# Patient Record
Sex: FEMALE | NOT HISPANIC OR LATINO | Employment: FULL TIME | URBAN - METROPOLITAN AREA
[De-identification: names, ages, dates, MRNs, and addresses within clinical notes are randomized per-mention and may not be internally consistent; named-entity substitution may affect disease eponyms.]

---

## 2019-02-11 ENCOUNTER — CONSULT (OUTPATIENT)
Dept: SURGICAL ONCOLOGY | Facility: CLINIC | Age: 49
End: 2019-02-11
Payer: COMMERCIAL

## 2019-02-11 VITALS
HEART RATE: 74 BPM | HEIGHT: 59 IN | SYSTOLIC BLOOD PRESSURE: 110 MMHG | TEMPERATURE: 98.4 F | WEIGHT: 219 LBS | RESPIRATION RATE: 14 BRPM | DIASTOLIC BLOOD PRESSURE: 72 MMHG | BODY MASS INDEX: 44.15 KG/M2

## 2019-02-11 DIAGNOSIS — R92.8 ABNORMAL MRI, BREAST: Primary | ICD-10-CM

## 2019-02-11 DIAGNOSIS — Z91.89 INCREASED RISK OF BREAST CANCER: ICD-10-CM

## 2019-02-11 DIAGNOSIS — Z80.3 FAMILY HISTORY OF MALIGNANT NEOPLASM OF BREAST: ICD-10-CM

## 2019-02-11 DIAGNOSIS — Z13.71 BRCA NEGATIVE: ICD-10-CM

## 2019-02-11 DIAGNOSIS — R92.2 DENSE BREAST TISSUE: ICD-10-CM

## 2019-02-11 PROBLEM — R92.30 DENSE BREAST TISSUE: Status: ACTIVE | Noted: 2019-02-11

## 2019-02-11 PROCEDURE — 99244 OFF/OP CNSLTJ NEW/EST MOD 40: CPT | Performed by: SURGERY

## 2019-02-11 RX ORDER — ERGOCALCIFEROL 1.25 MG/1
50000 CAPSULE ORAL WEEKLY
Refills: 3 | COMMUNITY
Start: 2018-11-28

## 2019-02-11 RX ORDER — POTASSIUM CHLORIDE 1500 MG/1
20 TABLET, EXTENDED RELEASE ORAL DAILY
Refills: 3 | COMMUNITY
Start: 2019-01-16

## 2019-02-11 RX ORDER — LISINOPRIL 20 MG/1
20 TABLET ORAL
COMMUNITY
Start: 2018-09-10

## 2019-02-11 RX ORDER — FEXOFENADINE HCL 180 MG/1
180 TABLET ORAL
COMMUNITY
Start: 2010-01-26

## 2019-02-11 RX ORDER — ALBUTEROL SULFATE 90 UG/1
1 AEROSOL, METERED RESPIRATORY (INHALATION) EVERY 4 HOURS PRN
COMMUNITY
Start: 2017-07-10

## 2019-02-11 RX ORDER — ALBUTEROL SULFATE 2.5 MG/3ML
SOLUTION RESPIRATORY (INHALATION)
COMMUNITY
Start: 2018-09-18

## 2019-02-11 RX ORDER — LEVOTHYROXINE SODIUM 112 UG/1
TABLET ORAL
COMMUNITY
Start: 2019-01-22

## 2019-02-11 RX ORDER — DICYCLOMINE HYDROCHLORIDE 10 MG/1
10 CAPSULE ORAL
COMMUNITY
Start: 2010-12-14

## 2019-02-11 RX ORDER — TRIAMTERENE AND HYDROCHLOROTHIAZIDE 37.5; 25 MG/1; MG/1
1 CAPSULE ORAL
COMMUNITY
Start: 2018-09-10

## 2019-02-11 RX ORDER — METFORMIN HYDROCHLORIDE 500 MG/1
1000 TABLET, EXTENDED RELEASE ORAL
COMMUNITY
Start: 2018-03-14

## 2019-02-11 RX ORDER — MEDROXYPROGESTERONE ACETATE 10 MG/1
10 TABLET ORAL
COMMUNITY
Start: 2018-08-21

## 2019-02-11 NOTE — LETTER
February 11, 2019     [address] [place] [id number]    Patient: Libia David   YOB: 1970   Date of Visit: 2/11/2019       Dear Dr Pily Siddiqui: Thank you for referring Libia David to me for evaluation  Below are my notes for this consultation  If you have questions, please do not hesitate to call me  I look forward to following your patient along with you  Sincerely,        Wendy Riley MD        CC: No Recipients  Wendy Riley MD  2/11/2019 10:00 AM  Sign at close encounter    Breast Consultation-Surgical Oncology     [address] [address]    Name:  Libia David  YOB: 1970  MRN:  [id number]    Assessment/Plan   Diagnoses and all orders for this visit:    Abnormal MRI, breast    Dense breast tissue    Increased risk of breast cancer    Family history of malignant neoplasm of breast    BRCA negative    Other orders  -     dicyclomine (BENTYL) 10 mg capsule; Take 10 mg by mouth  -     fexofenadine (ALLEGRA) 180 MG tablet; Take 180 mg by mouth  -     fluticasone-salmeterol (ADVAIR DISKUS) 250-50 mcg/dose inhaler; Inhale 1 puff  -     levothyroxine 112 mcg tablet; TAKE 1 TABLET BY MOUTH EVERY DAY  -     lisinopril (ZESTRIL) 20 mg tablet; Take 20 mg by mouth  -     medroxyPROGESTERone (PROVERA) 10 mg tablet; Take 10 mg by mouth  -     metFORMIN (GLUCOPHAGE-XR) 500 mg 24 hr tablet; Take 1,000 mg by mouth  -     albuterol (PROAIR HFA) 90 mcg/act inhaler; Inhale 1 puff every 4 (four) hours as needed  -     albuterol (2 5 mg/3 mL) 0 083 % nebulizer solution; TAKE 3 ML (2 5 MG TOTAL) BY NEBULIZATION 3 (THREE) TIMES A DAY  -     triamterene-hydrochlorothiazide (DYAZIDE) 37 5-25 mg per capsule; Take 1 capsule by mouth  -     KLOR-CON M20 20 MEQ tablet; Take 20 mEq by mouth daily  -     ergocalciferol (VITAMIN D2) 50,000 units;  Take 50,000 Units by mouth once a week  -     Multiple Vitamins-Minerals (MULTIVITAMIN ADULTS PO); daily  HPI: Yony Leal is a 53y o  year old female who presents with abnormal breast imaging  Pt shares on occasion she can feel a left lateral breast lump  She denies any nipple discharge or skin changes  She also has a family history of breast cancer in her mother and grandmother  She had genetic testing performed at [facility], which was negative  Surgical treatment to date consisted of n/a, prior benign biopsy  Oncology History:     No history exists  Pertinent reproductive history:  Age at menarche:  6  OB History        0    Para   0    Term   0       0    AB   0    Living   0       SAB   0    TAB   0    Ectopic   0    Multiple   0    Live Births   0           Obstetric Comments   Menarche : 10  Hx of BCP and Depo provera   Hx of hormone placement           Age at first live birth:  Never pregnant  Age at menopause:  n/a  Hysterectomy/Oophrectomy:  No  Hormone replacement therapy:  No  Birth control pills:  Yes    Problem List:   There is no problem list on file for this patient      Past Medical History:   Diagnosis Date    Asthma     Disease of thyroid gland     History of IBS     History of PCOS     Hypertension     Seasonal allergies     Stomach ulcer      Past Surgical History:   Procedure Laterality Date    BREAST BIOPSY Left 2012    LAPAROSCOPY       Family History   Problem Relation Age of Onset    Breast cancer Mother 61    Breast cancer Maternal Grandmother         at age 48 and 61    Colon cancer Maternal Grandmother 67    Brain cancer Maternal Grandfather 58     Social History     Socioeconomic History    Marital status: Unknown     Spouse name: Not on file    Number of children: Not on file    Years of education: Not on file    Highest education level: Not on file   Occupational History    Not on file   Social Needs    Financial resource strain: Not on file   Amanda Gonzalez Food insecurity:     Worry: Not on file     Inability: Not on file    Transportation needs:     Medical: Not on file     Non-medical: Not on file   Tobacco Use    Smoking status: Never Smoker    Smokeless tobacco: Never Used   Substance and Sexual Activity    Alcohol use: Not Currently    Drug use: Never    Sexual activity: Not on file   Lifestyle    Physical activity:     Days per week: Not on file     Minutes per session: Not on file    Stress: Not on file   Relationships    Social connections:     Talks on phone: Not on file     Gets together: Not on file     Attends Episcopal service: Not on file     Active member of club or organization: Not on file     Attends meetings of clubs or organizations: Not on file     Relationship status: Not on file    Intimate partner violence:     Fear of current or ex partner: Not on file     Emotionally abused: Not on file     Physically abused: Not on file     Forced sexual activity: Not on file   Other Topics Concern    Not on file   Social History Narrative    Not on file     Current Outpatient Medications   Medication Sig Dispense Refill    albuterol (2 5 mg/3 mL) 0 083 % nebulizer solution TAKE 3 ML (2 5 MG TOTAL) BY NEBULIZATION 3 (THREE) TIMES A DAY        albuterol (PROAIR HFA) 90 mcg/act inhaler Inhale 1 puff every 4 (four) hours as needed      dicyclomine (BENTYL) 10 mg capsule Take 10 mg by mouth      ergocalciferol (VITAMIN D2) 50,000 units Take 50,000 Units by mouth once a week  3    fexofenadine (ALLEGRA) 180 MG tablet Take 180 mg by mouth      fluticasone-salmeterol (ADVAIR DISKUS) 250-50 mcg/dose inhaler Inhale 1 puff      KLOR-CON M20 20 MEQ tablet Take 20 mEq by mouth daily  3    levothyroxine 112 mcg tablet TAKE 1 TABLET BY MOUTH EVERY DAY      lisinopril (ZESTRIL) 20 mg tablet Take 20 mg by mouth      medroxyPROGESTERone (PROVERA) 10 mg tablet Take 10 mg by mouth      metFORMIN (GLUCOPHAGE-XR) 500 mg 24 hr tablet Take 1,000 mg by mouth  Multiple Vitamins-Minerals (MULTIVITAMIN ADULTS PO) daily   triamterene-hydrochlorothiazide (DYAZIDE) 37 5-25 mg per capsule Take 1 capsule by mouth       No current facility-administered medications for this visit  Allergies   Allergen Reactions    Azithromycin      Other reaction(s): Respiratory Distress    Cefuroxime      Other reaction(s): Respiratory Distress    Amoxicillin Other (See Comments) and Rash    Nitrofurantoin Rash    Penicillin V Diarrhea and Rash     Other reaction(s): Respiratory Distress    Sulfamethoxazole-Trimethoprim Hives and Rash         The following portions of the patient's history were reviewed and updated as appropriate: allergies, current medications, past family history, past medical history, past social history, past surgical history and problem list     Review of Systems:  Review of Systems   Constitutional: Positive for unexpected weight change (15 lb weight loss)  Negative for appetite change and fever  Eyes: Negative  Respiratory: Positive for shortness of breath ( secondary to asthma)  Cardiovascular: Negative  Gastrointestinal: Negative  Endocrine: Negative  Genitourinary: Positive for hematuria ( previously evaluated)  Musculoskeletal: Negative  Negative for arthralgias and myalgias  Skin: Negative  Allergic/Immunologic: Negative  Neurological: Negative  Hematological: Negative  Negative for adenopathy  Does not bruise/bleed easily  Psychiatric/Behavioral: Negative  Physical Exam:  Physical Exam   Constitutional: She is oriented to person, place, and time  She appears well-developed and well-nourished  HENT:   Head: Normocephalic and atraumatic  Cardiovascular: Normal heart sounds  Pulmonary/Chest: Breath sounds normal  Right breast exhibits no inverted nipple, no mass, no nipple discharge, no skin change and no tenderness   Left breast exhibits no inverted nipple, no mass, no nipple discharge, no skin change and no tenderness  Abdominal: Soft  Lymphadenopathy:        Right axillary: No pectoral and no lateral adenopathy present  Left axillary: No pectoral and no lateral adenopathy present  Right: No supraclavicular adenopathy present  Left: No supraclavicular adenopathy present  Neurological: She is alert and oriented to person, place, and time  Psychiatric: She has a normal mood and affect  [id number]  3D bilateral screening mammogram B2 (3)    Breast MRI  B0 for lesion on the left side  18 left breast second look US  B3 benign appearing and thought to be a fibroadenoma  18 left breast US  B3 stable lesion for which an MRI was recommended in March at the time of her mammogram          Discussion/Summary:  81-year-old female here today secondary to a concern on her breast imaging as well as her family history of breast cancer  Her mother had breast cancer at the age of 61  Her maternal grandmother had bilateral breast cancer  Both her mother and the patient herself had genetic testing, which was negative  This is in Care [facility] from [facility]  There are no concerns on her examination today  The patient does have dense breast tissue as well  She states that she is scheduled for the March mammogram and MRI per her gynecologist   She will also be seeing her gynecologist this coming spring/summer  I asked her to copy me on the radiology results  Provided there are no concerns, I will plan to see her in one year for another clinical exam   Given her risk as well as dense tissue, she should continue both mammography and breast MRI  These would be best done in alternating fashion in the future  I advised her to return sooner should she have any concerns

## 2019-02-11 NOTE — PROGRESS NOTES
Breast Consultation-Surgical Oncology     [address]  [facility] [place]  [address] [place] [address]    Name:  Megan Dodd  YOB: 1970  MRN:  [id number]    Assessment/Plan   Diagnoses and all orders for this visit:    Abnormal MRI, breast    Dense breast tissue    Increased risk of breast cancer    Family history of malignant neoplasm of breast    BRCA negative    Other orders  -     dicyclomine (BENTYL) 10 mg capsule; Take 10 mg by mouth  -     fexofenadine (ALLEGRA) 180 MG tablet; Take 180 mg by mouth  -     fluticasone-salmeterol (ADVAIR DISKUS) 250-50 mcg/dose inhaler; Inhale 1 puff  -     levothyroxine 112 mcg tablet; TAKE 1 TABLET BY MOUTH EVERY DAY  -     lisinopril (ZESTRIL) 20 mg tablet; Take 20 mg by mouth  -     medroxyPROGESTERone (PROVERA) 10 mg tablet; Take 10 mg by mouth  -     metFORMIN (GLUCOPHAGE-XR) 500 mg 24 hr tablet; Take 1,000 mg by mouth  -     albuterol (PROAIR HFA) 90 mcg/act inhaler; Inhale 1 puff every 4 (four) hours as needed  -     albuterol (2 5 mg/3 mL) 0 083 % nebulizer solution; TAKE 3 ML (2 5 MG TOTAL) BY NEBULIZATION 3 (THREE) TIMES A DAY  -     triamterene-hydrochlorothiazide (DYAZIDE) 37 5-25 mg per capsule; Take 1 capsule by mouth  -     KLOR-CON M20 20 MEQ tablet; Take 20 mEq by mouth daily  -     ergocalciferol (VITAMIN D2) 50,000 units; Take 50,000 Units by mouth once a week  -     Multiple Vitamins-Minerals (MULTIVITAMIN ADULTS PO); daily  HPI: Megan Dodd is a 53y o  year old female who presents with abnormal breast imaging  Pt shares on occasion she can feel a left lateral breast lump  She denies any nipple discharge or skin changes  She also has a family history of breast cancer in her mother and grandmother  She had genetic testing performed at [facility], which was negative  Surgical treatment to date consisted of n/a, prior benign biopsy      Oncology History:     No history exists  Pertinent reproductive history:  Age at menarche:  6  OB History        0    Para   0    Term   0       0    AB   0    Living   0       SAB   0    TAB   0    Ectopic   0    Multiple   0    Live Births   0           Obstetric Comments   Menarche : 10  Hx of BCP and Depo provera   Hx of hormone placement           Age at first live birth:  Never pregnant  Age at menopause:  n/a  Hysterectomy/Oophrectomy:  No  Hormone replacement therapy:  No  Birth control pills:  Yes    Problem List:   There is no problem list on file for this patient      Past Medical History:   Diagnosis Date    Asthma     Disease of thyroid gland     History of IBS     History of PCOS     Hypertension     Seasonal allergies     Stomach ulcer      Past Surgical History:   Procedure Laterality Date    BREAST BIOPSY Left 2012    LAPAROSCOPY  2010     Family History   Problem Relation Age of Onset    Breast cancer Mother 61    Breast cancer Maternal Grandmother         at age 48 and 61    Colon cancer Maternal Grandmother 67    Brain cancer Maternal Grandfather 58     Social History     Socioeconomic History    Marital status: Unknown     Spouse name: Not on file    Number of children: Not on file    Years of education: Not on file    Highest education level: Not on file   Occupational History    Not on file   Social Needs    Financial resource strain: Not on file    Food insecurity:     Worry: Not on file     Inability: Not on file    Transportation needs:     Medical: Not on file     Non-medical: Not on file   Tobacco Use    Smoking status: Never Smoker    Smokeless tobacco: Never Used   Substance and Sexual Activity    Alcohol use: Not Currently    Drug use: Never    Sexual activity: Not on file   Lifestyle    Physical activity:     Days per week: Not on file     Minutes per session: Not on file    Stress: Not on file   Relationships    Social connections:     Talks on phone: Not on file Gets together: Not on file     Attends Gnosticism service: Not on file     Active member of club or organization: Not on file     Attends meetings of clubs or organizations: Not on file     Relationship status: Not on file    Intimate partner violence:     Fear of current or ex partner: Not on file     Emotionally abused: Not on file     Physically abused: Not on file     Forced sexual activity: Not on file   Other Topics Concern    Not on file   Social History Narrative    Not on file     Current Outpatient Medications   Medication Sig Dispense Refill    albuterol (2 5 mg/3 mL) 0 083 % nebulizer solution TAKE 3 ML (2 5 MG TOTAL) BY NEBULIZATION 3 (THREE) TIMES A DAY   albuterol (PROAIR HFA) 90 mcg/act inhaler Inhale 1 puff every 4 (four) hours as needed      dicyclomine (BENTYL) 10 mg capsule Take 10 mg by mouth      ergocalciferol (VITAMIN D2) 50,000 units Take 50,000 Units by mouth once a week  3    fexofenadine (ALLEGRA) 180 MG tablet Take 180 mg by mouth      fluticasone-salmeterol (ADVAIR DISKUS) 250-50 mcg/dose inhaler Inhale 1 puff      KLOR-CON M20 20 MEQ tablet Take 20 mEq by mouth daily  3    levothyroxine 112 mcg tablet TAKE 1 TABLET BY MOUTH EVERY DAY      lisinopril (ZESTRIL) 20 mg tablet Take 20 mg by mouth      medroxyPROGESTERone (PROVERA) 10 mg tablet Take 10 mg by mouth      metFORMIN (GLUCOPHAGE-XR) 500 mg 24 hr tablet Take 1,000 mg by mouth      Multiple Vitamins-Minerals (MULTIVITAMIN ADULTS PO) daily   triamterene-hydrochlorothiazide (DYAZIDE) 37 5-25 mg per capsule Take 1 capsule by mouth       No current facility-administered medications for this visit        Allergies   Allergen Reactions    Azithromycin      Other reaction(s): Respiratory Distress    Cefuroxime      Other reaction(s): Respiratory Distress    Amoxicillin Other (See Comments) and Rash    Nitrofurantoin Rash    Penicillin V Diarrhea and Rash     Other reaction(s): Respiratory Distress    Sulfamethoxazole-Trimethoprim Hives and Rash         The following portions of the patient's history were reviewed and updated as appropriate: allergies, current medications, past family history, past medical history, past social history, past surgical history and problem list     Review of Systems:  Review of Systems   Constitutional: Positive for unexpected weight change (15 lb weight loss)  Negative for appetite change and fever  Eyes: Negative  Respiratory: Positive for shortness of breath ( secondary to asthma)  Cardiovascular: Negative  Gastrointestinal: Negative  Endocrine: Negative  Genitourinary: Positive for hematuria ( previously evaluated)  Musculoskeletal: Negative  Negative for arthralgias and myalgias  Skin: Negative  Allergic/Immunologic: Negative  Neurological: Negative  Hematological: Negative  Negative for adenopathy  Does not bruise/bleed easily  Psychiatric/Behavioral: Negative  Physical Exam:  Physical Exam   Constitutional: She is oriented to person, place, and time  She appears well-developed and well-nourished  HENT:   Head: Normocephalic and atraumatic  Cardiovascular: Normal heart sounds  Pulmonary/Chest: Breath sounds normal  Right breast exhibits no inverted nipple, no mass, no nipple discharge, no skin change and no tenderness  Left breast exhibits no inverted nipple, no mass, no nipple discharge, no skin change and no tenderness  Abdominal: Soft  Lymphadenopathy:        Right axillary: No pectoral and no lateral adenopathy present  Left axillary: No pectoral and no lateral adenopathy present  Right: No supraclavicular adenopathy present  Left: No supraclavicular adenopathy present  Neurological: She is alert and oriented to person, place, and time  Psychiatric: She has a normal mood and affect             [id number]  3D bilateral screening mammogram B2 (3)    Breast MRI  B0 for lesion on the left side  09/21/18 left breast second look US  B3 benign appearing and thought to be a fibroadenoma  12/24/18 left breast US  B3 stable lesion for which an MRI was recommended in March at the time of her mammogram          Discussion/Summary:  52-year-old female here today secondary to a concern on her breast imaging as well as her family history of breast cancer  Her mother had breast cancer at the age of 61  Her maternal grandmother had bilateral breast cancer  Both her mother and the patient herself had genetic testing, which was negative  This is in Care everywhere from [facility]  There are no concerns on her examination today  The patient does have dense breast tissue as well  She states that she is scheduled for the March mammogram and MRI per her gynecologist   She will also be seeing her gynecologist this coming spring/summer  I asked her to copy me on the radiology results  Provided there are no concerns, I will plan to see her in one year for another clinical exam   Given her risk as well as dense tissue, she should continue both mammography and breast MRI  These would be best done in alternating fashion in the future  I advised her to return sooner should she have any concerns

## 2020-04-20 ENCOUNTER — TELEPHONE (OUTPATIENT)
Dept: SURGICAL ONCOLOGY | Facility: CLINIC | Age: 50
End: 2020-04-20

## 2020-04-21 ENCOUNTER — OFFICE VISIT (OUTPATIENT)
Dept: SURGICAL ONCOLOGY | Facility: CLINIC | Age: 50
End: 2020-04-21
Payer: COMMERCIAL

## 2020-04-21 VITALS
HEART RATE: 113 BPM | DIASTOLIC BLOOD PRESSURE: 80 MMHG | WEIGHT: 225.4 LBS | TEMPERATURE: 97.8 F | RESPIRATION RATE: 16 BRPM | HEIGHT: 59 IN | BODY MASS INDEX: 45.44 KG/M2 | SYSTOLIC BLOOD PRESSURE: 140 MMHG

## 2020-04-21 DIAGNOSIS — R92.2 DENSE BREAST TISSUE: Primary | ICD-10-CM

## 2020-04-21 DIAGNOSIS — Z12.31 SCREENING MAMMOGRAM FOR HIGH-RISK PATIENT: ICD-10-CM

## 2020-04-21 DIAGNOSIS — Z80.3 FAMILY HISTORY OF MALIGNANT NEOPLASM OF BREAST: ICD-10-CM

## 2020-04-21 DIAGNOSIS — Z13.71 BRCA NEGATIVE: ICD-10-CM

## 2020-04-21 DIAGNOSIS — Z91.89 INCREASED RISK OF BREAST CANCER: ICD-10-CM

## 2020-04-21 PROBLEM — D24.2 FIBROADENOMA OF BREAST, LEFT: Status: RESOLVED | Noted: 2020-04-21 | Resolved: 2020-04-21

## 2020-04-21 PROBLEM — R92.8 ABNORMAL MRI, BREAST: Status: RESOLVED | Noted: 2019-02-11 | Resolved: 2020-04-21

## 2020-04-21 PROBLEM — D24.2 FIBROADENOMA OF BREAST, LEFT: Status: ACTIVE | Noted: 2020-04-21

## 2020-04-21 PROCEDURE — 99213 OFFICE O/P EST LOW 20 MIN: CPT | Performed by: SURGERY

## 2020-12-03 DIAGNOSIS — Z80.3 FAMILY HISTORY OF MALIGNANT NEOPLASM OF BREAST: ICD-10-CM

## 2020-12-03 DIAGNOSIS — Z91.89 INCREASED RISK OF BREAST CANCER: ICD-10-CM

## 2020-12-03 DIAGNOSIS — R92.2 DENSE BREAST TISSUE: ICD-10-CM

## 2021-02-26 DIAGNOSIS — Z23 ENCOUNTER FOR IMMUNIZATION: ICD-10-CM

## 2021-05-04 DIAGNOSIS — Z12.31 SCREENING MAMMOGRAM FOR HIGH-RISK PATIENT: ICD-10-CM

## 2021-05-11 ENCOUNTER — OFFICE VISIT (OUTPATIENT)
Dept: SURGICAL ONCOLOGY | Facility: CLINIC | Age: 51
End: 2021-05-11
Payer: COMMERCIAL

## 2021-05-11 VITALS
SYSTOLIC BLOOD PRESSURE: 150 MMHG | WEIGHT: 221 LBS | HEIGHT: 59 IN | TEMPERATURE: 97.3 F | DIASTOLIC BLOOD PRESSURE: 84 MMHG | BODY MASS INDEX: 44.55 KG/M2 | HEART RATE: 88 BPM

## 2021-05-11 DIAGNOSIS — Z91.89 INCREASED RISK OF BREAST CANCER: ICD-10-CM

## 2021-05-11 DIAGNOSIS — Z13.71 BRCA NEGATIVE: ICD-10-CM

## 2021-05-11 DIAGNOSIS — R92.2 DENSE BREAST TISSUE: Primary | ICD-10-CM

## 2021-05-11 DIAGNOSIS — Z12.31 SCREENING MAMMOGRAM FOR HIGH-RISK PATIENT: ICD-10-CM

## 2021-05-11 DIAGNOSIS — Z80.3 FAMILY HISTORY OF MALIGNANT NEOPLASM OF BREAST: ICD-10-CM

## 2021-05-11 PROCEDURE — 99213 OFFICE O/P EST LOW 20 MIN: CPT | Performed by: SURGERY

## 2021-05-11 NOTE — PROGRESS NOTES
Surgical Oncology Follow Up       [address] [facility] [address]    [facility]  1970  [id number]  [address]  [facility] SURGICAL ONCOLOGY [place]  [place] [address]    Chief Complaint   Patient presents with    Follow-up       Assessment/Plan   Diagnoses and all orders for this visit:    Dense breast tissue  -     MRI breast bilateral w and wo contrast w cad; Future    BRCA negative    Family history of malignant neoplasm of breast  -     MRI breast bilateral w and wo contrast w cad; Future    Increased risk of breast cancer  -     MRI breast bilateral w and wo contrast w cad; Future    Screening mammogram for high-risk patient  -     Mammo screening bilateral w 3d & cad; Future        Advance Care Planning/Advance Directives:  Did not discuss  with the patient  Oncology History:    Oncology History    No history exists  History of Present Illness: Follow-up visit secondary to dense breast tissue and family history of breast cancer, no concerns  -Interval History: recent mammogram and MRI    Review of Systems:  Review of Systems   Constitutional: Negative  Negative for appetite change and fever  Eyes: Negative  Respiratory: Negative for shortness of breath  Cardiovascular: Negative  Gastrointestinal: Negative  Endocrine: Negative  Genitourinary: Negative  Musculoskeletal: Negative  Negative for arthralgias and myalgias  Skin: Negative  Allergic/Immunologic: Negative  Neurological: Negative  Hematological: Negative  Negative for adenopathy  Does not bruise/bleed easily  Psychiatric/Behavioral: Negative          Patient Active Problem List   Diagnosis    Dense breast tissue    Increased risk of breast cancer    Family history of malignant neoplasm of breast    BRCA negative    Screening mammogram for high-risk patient     Past Medical History:   Diagnosis Date    Asthma     Disease of thyroid gland     History of IBS     History of PCOS     Hypertension     Seasonal allergies     Stomach ulcer      Past Surgical History:   Procedure Laterality Date    BREAST BIOPSY Left 2012    LAPAROSCOPY  2010     Family History   Problem Relation Age of Onset    Breast cancer Mother 61    Breast cancer Maternal Grandmother         at age 48 and 61    Colon cancer Maternal Grandmother 67    Brain cancer Maternal Grandfather 58     Social History     Socioeconomic History    Marital status: Unknown     Spouse name: Not on file    Number of children: Not on file    Years of education: Not on file    Highest education level: Not on file   Occupational History    Not on file   Social Needs    Financial resource strain: Not on file    Food insecurity     Worry: Not on file     Inability: Not on file    Transportation needs     Medical: Not on file     Non-medical: Not on file   Tobacco Use    Smoking status: Never Smoker    Smokeless tobacco: Never Used   Substance and Sexual Activity    Alcohol use: Not Currently    Drug use: Never    Sexual activity: Not on file   Lifestyle    Physical activity     Days per week: Not on file     Minutes per session: Not on file    Stress: Not on file   Relationships    Social connections     Talks on phone: Not on file     Gets together: Not on file     Attends Restorationist service: Not on file     Active member of club or organization: Not on file     Attends meetings of clubs or organizations: Not on file     Relationship status: Not on file    Intimate partner violence     Fear of current or ex partner: Not on file     Emotionally abused: Not on file     Physically abused: Not on file     Forced sexual activity: Not on file   Other Topics Concern    Not on file   Social History Narrative    Not on file       Current Outpatient Medications:     albuterol (2 5 mg/3 mL) 0 083 % nebulizer solution, TAKE 3 ML (2 5 MG TOTAL) BY NEBULIZATION 3 (THREE) TIMES A DAY , Disp: , Rfl:     albuterol (PROAIR HFA) 90 mcg/act inhaler, Inhale 1 puff every 4 (four) hours as needed, Disp: , Rfl:     dicyclomine (BENTYL) 10 mg capsule, Take 10 mg by mouth, Disp: , Rfl:     ergocalciferol (VITAMIN D2) 50,000 units, Take 50,000 Units by mouth once a week, Disp: , Rfl: 3    fexofenadine (ALLEGRA) 180 MG tablet, Take 180 mg by mouth, Disp: , Rfl:     fluticasone-salmeterol (ADVAIR DISKUS) 250-50 mcg/dose inhaler, Inhale 1 puff, Disp: , Rfl:     KLOR-CON M20 20 MEQ tablet, Take 20 mEq by mouth daily, Disp: , Rfl: 3    levothyroxine 112 mcg tablet, TAKE 1 TABLET BY MOUTH EVERY DAY, Disp: , Rfl:     lisinopril (ZESTRIL) 20 mg tablet, Take 20 mg by mouth, Disp: , Rfl:     medroxyPROGESTERone (PROVERA) 10 mg tablet, Take 10 mg by mouth, Disp: , Rfl:     metFORMIN (GLUCOPHAGE-XR) 500 mg 24 hr tablet, Take 1,000 mg by mouth, Disp: , Rfl:     Multiple Vitamins-Minerals (MULTIVITAMIN ADULTS PO), daily  , Disp: , Rfl:     triamterene-hydrochlorothiazide (DYAZIDE) 37 5-25 mg per capsule, Take 1 capsule by mouth, Disp: , Rfl:   Allergies   Allergen Reactions    Azithromycin      Other reaction(s): Respiratory Distress    Cefuroxime      Other reaction(s): Respiratory Distress    Amoxicillin Other (See Comments) and Rash    Nitrofurantoin Rash    Penicillin V Diarrhea and Rash     Other reaction(s): Respiratory Distress    Sulfamethoxazole-Trimethoprim Hives and Rash       The following portions of the patient's history were reviewed and updated as appropriate: allergies, current medications, past family history, past medical history, past social history, past surgical history and problem list         Vitals:    05/11/21 0856   BP: 150/84   Pulse: 88   Temp: (!) 97 3 °F (36 3 °C)       Physical Exam  Constitutional:       General: She is not in acute distress  Appearance: She is well-developed     HENT:      Head: Normocephalic and atraumatic  Chest:      Breasts:         Right: No inverted nipple, mass, nipple discharge, skin change or tenderness  Left: No inverted nipple, mass, nipple discharge, skin change or tenderness  Lymphadenopathy:      Upper Body:      Right upper body: No supraclavicular, axillary or pectoral adenopathy  Left upper body: No supraclavicular, axillary or pectoral adenopathy  Neurological:      Mental Status: She is alert and oriented to person, place, and time  Psychiatric:         Mood and Affect: Mood normal            Results:  Labs:      Imaging   11/28/2020 bilateral breast MRI was benign   05/04/2021 bilateral 3D screening mammogram was benign BI-RADS two with a density of three    I reviewed the above imaging data  Discussion/Summary: 78-year-old female with dense breast tissue and family history of breast cancer  She had a prior benign biopsy revealing a fibroadenoma  She is a high risk patient  There are no concerns on examination today  All of her imaging has remained benign to include alternating mammography and breast MRI  I will continue to see her on an annual basis continue both imaging modalities for the time being

## 2022-05-13 ENCOUNTER — TELEPHONE (OUTPATIENT)
Dept: SURGICAL ONCOLOGY | Facility: CLINIC | Age: 52
End: 2022-05-13

## 2022-05-13 NOTE — TELEPHONE ENCOUNTER
Called and left voicemail stating, Dr Brandon had a personal emergency, we will need to reschedule your upcoming appointment on Monday, 5/16  I have cancelled your appointment  We will call you on Monday to reschedule your appointment or if you'd like to call me today, my phone number is [phone]  I will be at this number until 4:30pm today

## 2023-06-06 ENCOUNTER — TELEPHONE (OUTPATIENT)
Dept: BARIATRICS | Facility: CLINIC | Age: 53
End: 2023-06-06

## 2023-06-06 NOTE — PROGRESS NOTES
Weight Management Medical Nutrition Assessment  Ervin Mulligan presented for a meal planning session  Today's weight is 229#  Ervin Mulligan is very motivated  She reports a history of weight loss on her own  In the past she monitored portion sizes vs counting calories  Per dietary recall patient consumes excess calories from sweet carbohydrates and unmeasured portion sizes during meals  We reviewed calorie and protein goals for meals and snacks  Discussed portion control strategies  Reviewed high protein snacks that satisfy sweet cravings  Patient agreeable to add a snack mid-afternoon  She has a goal to start exercising more (return to the gym and walk daily)  We developed and reviewed a low calorie meal plan      Patient seen by Medical Provider in past 6 months:  no  Requested to schedule appointment with Medical Provider: No      Anthropometric Measurements  Start Weight (#): 229#  Current Weight (#): 229#  TBW % Change from start weight: n/a  Ideal Body Weight (#): 95#  Goal Weight (#): 200#  Highest:  Lowest:    Weight Loss History  Previous weight loss attempts: met with dietitian in 2018, Diet    Food and Nutrition Related History  Wake up: 8-8:30AM  Bed Time: 9-11PM    Food Recall  Breakfast: 9-9:15AM carb controlled bread with peanut butter OR Mark Charms OR 3 eggs   Snack: skip  Lunch: 11AM-12:30PM turkey sandwich or grilled chicken salad with 1 Tbsp honey mustard OR leftovers (zucchini, pork chops, ribs, etc)  Snack: skip  Dinner: 5-6PM 3oz protein (pork chops, chicken, ribs, meatloaf), 1 cup starch (corn on the cob, sometimes rice, baked potateos), 1/2 cup vegetable (mixed vegetables, spinach)   Snack: soft pretzel bites OR bowl of Mark Charms OR ice cream    Beverages: 32oz bottles water, 20oz zero sugar gingerale  Volume of beverage intake:     Weekends: Same  Cravings: sometimes sweets at night  Trouble area of day: after dinner    Frequency of Eating out: 2 nights per week  Food restrictions: none reported Cooking: self or significant other  Food Shopping: self    Physical Activity Intake  Activity: active job (goal to incorporate exercise)  Frequency: daily  Physical limitations/barriers to exercise: none reported     Estimated Needs  Energy  [employer] Needs: BMR : 7531   1-2# loss weekly sedentary: 742-9277            1-2# loss weekly lightly active: 3030-5150  Maintenance calories for sedentary activity level: 1871  Protein: 52-65g      (1 2-1 5g/kg IBW)  Fluid: 55oz     (35mL/kg IBW)    Nutrition Diagnosis  Yes;     Overweight/obesity  related to Food and nutrition related knowledge deficit as evidenced by  BMI more than normative standard for age and sex (obesity-grade III 36+)       Nutrition Intervention    Nutrition Prescription  Calories: 5228-6704 calories (flex up if exercising)  Protein: 52-65g  Fluid: 55oz    Meal Plan (Vladimir/Pro/Carb)  Breakfast: 300/20/30-45  Snack:  Lunch: 300/20/30-45  Snack: 100-150/15/15-20  Dinner: 300-350/20/30-45  Snack: 100-150/5-10/15-20    Nutrition Education:    Calorie controlled menu  Lean protein food choices  Healthy snack options  Food journaling tips      Nutrition Counseling:  Strategies: meal planning, portion sizes, healthy snack choices, hydration, fiber intake, protein intake, exercise, food journal      Monitoring and Evaluation:  Evaluation criteria:  Energy Intake  Meet protein needs  Maintain adequate hydration  Monitor weekly weight  Meal planning/preparation  Food journal   Decreased portions at mealtimes and snacks  Physical activity     Barriers to learning:none  Readiness to change: Preparation:  (Getting ready to change)   Comprehension: good  Expected Compliance: good

## 2023-06-06 NOTE — TELEPHONE ENCOUNTER
Called patient and left a message to give the office a call back per her request to schedule a NP appointment

## 2023-06-07 ENCOUNTER — OFFICE VISIT (OUTPATIENT)
Dept: BARIATRICS | Facility: CLINIC | Age: 53
End: 2023-06-07

## 2023-06-07 VITALS — BODY MASS INDEX: 46.16 KG/M2 | HEIGHT: 59 IN | WEIGHT: 229 LBS

## 2023-06-07 DIAGNOSIS — R63.5 ABNORMAL WEIGHT GAIN: ICD-10-CM

## 2023-06-07 PROCEDURE — RECHECK

## 2023-06-07 PROCEDURE — WMDI30

## 2024-04-26 ENCOUNTER — EVALUATION (OUTPATIENT)
Dept: PHYSICAL THERAPY | Facility: CLINIC | Age: 54
End: 2024-04-26

## 2024-04-26 DIAGNOSIS — N81.2 UTEROVAGINAL PROLAPSE, INCOMPLETE: Primary | ICD-10-CM

## 2024-04-26 DIAGNOSIS — R35.0 URINARY FREQUENCY: ICD-10-CM

## 2024-04-26 PROCEDURE — 97161 PT EVAL LOW COMPLEX 20 MIN: CPT | Performed by: PHYSICAL THERAPIST

## 2024-04-26 NOTE — PROGRESS NOTES
PT Evaluation     Today's date: 2024  Patient name: Katarina Copeland  : 1970  MRN: [id number]  Referring provider: Brenda Reyna PA-C  Dx:   Encounter Diagnosis     ICD-10-CM    1. Uterovaginal prolapse, incomplete  N81.2       2. Urinary frequency  R35.0                      Assessment  Assessment details: Katarina Copeland is a 53 y.o. female who presents to physical therapy with Uterovaginal prolapse, incomplete  (primary encounter diagnosis) and Urinary frequency. Internal assessment was deferred by patient at this time. Functional impairments include poor ability to delay urinary urge, frequency of urination, alternating constipation with frequency of defecation, and difficulty with delaying urge during long car rides. Physical findings include restrictions in diaphragm motion, diastasis recti, poor management of intraabdominal pressure, and poor coordination among breathing, core, and pelvic floor muscles. Katarina Copeland would benefit from formal physical therapy to address impairments as detailed, decrease pain, and restore maximal level of function for all home, work, and mobility tasks. Thank you for this referral.    Impairments: abnormal coordination, abnormal muscle firing, abnormal muscle tone, lacks appropriate home exercise program and poor body mechanics  Understanding of Dx/Px/POC: good   Prognosis: good    Goals  Short term goals (to be met in 4 weeks):  1. Pt will improve intervoid period to at least 30-45 minutes.  2. Pt will reduce ANGELA by at least 1 finger width.  3. Pt will demonstrate improved bowel mechanics to avoid increase in intraabdominal pressure.    Long term goals (to be met by discharge):  1. Pt will be compliant with HEP.  2. Pt will demonstrate ANGELA <2 finger widths.  3. Pt will improve intervoid period to 1-2 hours to avoid work disruptions.      Plan  Plan details: May decrease frequency secondary to high copay  Patient would benefit from: PT eval and skilled physical  therapy  Planned modality interventions: biofeedback  Planned therapy interventions: abdominal trunk stabilization, activity modification, joint mobilization, manual therapy, massage, Headley taping, neuromuscular re-education, patient education, postural training, strengthening, stretching, therapeutic activities, therapeutic exercise, behavior modification, body mechanics training, breathing training and home exercise program  Frequency: 1x week  Duration in visits: 12  Treatment plan discussed with: patient    PT Pelvic Floor Subjective:   History of Present Illness:   Katarina has been having a lot of problems. She thought she had a UTI, was given antibiotics, had hematuria. She was feeling full in her abdomen. She talked to a surgeon and was referred to pelvic doctor. She has to pee again right after she finishes. She likes to go for walks and hasn't been able to secondary to OAB. She tried medication but got sick from it. She is a part time  at a school. Sometimes she is able to delay urge and sometimes she can't. She must be able to lift things for work. She had stopped her water pill and still has increased frequency. She has cut back on her water intake. She drinks 3-4 bottles throughout the day. When she does Kegel exercises she has to go more. She has been doing Kegels in the car at stoplights. She feels like she voids every 10-15 minutes. Katarina states that urinary problem has been going on for a year, has got out of hand for the past few months. She can't tolerate long car rides due to urinary problem.  Social Support:     Lives in:  Multiple-level home    Lives with:  Spouse    Relationship status: /committed    Work status: employed part time    Life stress severity: mild  Diet and Exercise:    Diet:balanced nutrition    Exercise type: walking    Exercise frequency: daily    3-5 walks/day depending on schedule  OB/ gyn History    Gestational History:     Prior Pregnancy: No       "Menstrual History:      Menopausal: menopause (last period was May 2019)  no hormone replacement therapy  Pelvic MD noted that her cervix is low, unsure if it is from pushing for BM or from lifting for work  Bladder Function:     Voiding Difficulties positive for: urgency (not going to avoid pain, has pain more with starting urination), frequent urination, hesitancy (sometimes it takes a while to start), straining and incomplete emptying       Voiding Difficulties comments:     Voiding frequency: every 15-30 minutes    Urinary leakage: no urine leakage    Urinary leakage not aggravated by: post-void dribble    Nocturia (episodes per night): 4 or more (5x/night, sometimes able to get back to sleep)    Painful urination: Yes (thought she had UTI, was hematuria, since )      Fluid Intake Type:  Water  Bowel Function:     Voiding DIfficulties: urgency, painful defecating, unfinished feeling after defecating and constipation      Bowel frequency: daily and every 3 days (varies)  Sexual Function:     Sexually Active:  Not sexually active    Pain during intercourse: Yes    Pain:     At best pain ratin    Location:  Pelvic    Progression:  Worsening  Diagnostic Tests:     Ultrasound: normal (has fibroid near uterus)  Treatments:   Upcoming doctor's appointment: yes  Date of next appointment: 2024  Patient Goals:     Patient goals for therapy:  Fully empty bladder or bowels, improved bladder or bowel function and improved quality of life    Other patient goals:  Learn some exercises to help      Objective       Abdominal Assessment:      Position: supine exam  Abdominal Assessment: Significant soft tissue restriction over B diaphragm  Diaphragmatic breath- moderate rib and belly excursion  Some tightness through abdomen, no TTP    Diastatis   Diastasis recti present: yes  3\" above umbilicus (# fingers): 4  Umbilicus (# fingers): 4  3\" below umbilicus (# fingers): 0  Connective tissue integrity at linea alba: " firm  no tenderness at linea alba  able to engage transverse abdominis       General Perineum Exam:     General perineum exam comments: Palpated over top of clothing in sidelying  Able to distinguish between maximal and submax contraction    Visual Inspection of Perineum:   Excursion of perineal body in cephalad direction with contraction of pelvic floor muscles (PFM): good  Excursion of perineal body in caudal direction with relaxation of pelvic floor muscles (PFM): fair   Involuntary relaxation with bearing down: yes        Pelvic Floor Muscle Exam:             no pelvic floor exam consent given by patient              Precautions: IBS, PCOS, thyroid; high copay (1 man, 1 TE)      Manuals 4/26/24            TLF skin rolling nv            Diaphragm mobs nv                                      Neuro Re-Ed                                                                                                        Ther Ex             TA + chin lift reviewed            TA + sheet brace + SLR nv            TA + bridge nv            Piriformis stretch nv            SKC stretch nv            Happy baby nv            Child's pose nv            Supported lumbar flexion stretch nv            Ther Activity             Defecation mechanics reviewed                         Gait Training                                       Modalities

## 2024-04-26 NOTE — LETTER
2024    Brenda Reyna PA-C  [address] [facility]  [facility] [address]    Patient: Katarina Copeland   YOB: 1970   Date of Visit: 2024     Encounter Diagnosis     ICD-10-CM    1. Uterovaginal prolapse, incomplete  N81.2       2. Urinary frequency  R35.0           Dear Dr. Reyna:    Thank you for your recent referral of Katarina Copeland. Please review the attached evaluation summary from Katarina's recent visit.     Please verify that you agree with the plan of care by signing the attached order.     If you have any questions or concerns, please do not hesitate to call.     I sincerely appreciate the opportunity to share in the care of one of your patients and hope to have another opportunity to work with you in the near future.       Sincerely,    Jeny Piña, PT      Referring Provider:      I certify that I have read the below Plan of Care and certify the need for these services furnished under this plan of treatment while under my care.                    Brenda Reyna PA-C  [address] [facility] [address]  Via Fax: [phone]          PT Evaluation     Today's date: 2024  Patient name: Katarina Copeland  : 1970  MRN: [id number]  Referring provider: Brenda Reyna PA-C  Dx:   Encounter Diagnosis     ICD-10-CM    1. Uterovaginal prolapse, incomplete  N81.2       2. Urinary frequency  R35.0                      Assessment  Assessment details: Katarina Copeland is a 53 y.o. female who presents to physical therapy with Uterovaginal prolapse, incomplete  (primary encounter diagnosis) and Urinary frequency. Internal assessment was deferred by patient at this time. Functional impairments include poor ability to delay urinary urge, frequency of urination, alternating constipation with frequency of defecation, and difficulty with delaying urge during long car rides. Physical findings include restrictions in diaphragm motion,  diastasis recti, poor management of intraabdominal pressure, and poor coordination among breathing, core, and pelvic floor muscles. Katarina Copeland would benefit from formal physical therapy to address impairments as detailed, decrease pain, and restore maximal level of function for all home, work, and mobility tasks. Thank you for this referral.    Impairments: abnormal coordination, abnormal muscle firing, abnormal muscle tone, lacks appropriate home exercise program and poor body mechanics  Understanding of Dx/Px/POC: good   Prognosis: good    Goals  Short term goals (to be met in 4 weeks):  1. Pt will improve intervoid period to at least 30-45 minutes.  2. Pt will reduce ANGELA by at least 1 finger width.  3. Pt will demonstrate improved bowel mechanics to avoid increase in intraabdominal pressure.    Long term goals (to be met by discharge):  1. Pt will be compliant with HEP.  2. Pt will demonstrate ANGELA <2 finger widths.  3. Pt will improve intervoid period to 1-2 hours to avoid work disruptions.      Plan  Plan details: May decrease frequency secondary to high copay  Patient would benefit from: PT eval and skilled physical therapy  Planned modality interventions: biofeedback  Planned therapy interventions: abdominal trunk stabilization, activity modification, joint mobilization, manual therapy, massage, Headley taping, neuromuscular re-education, patient education, postural training, strengthening, stretching, therapeutic activities, therapeutic exercise, behavior modification, body mechanics training, breathing training and home exercise program  Frequency: 1x week  Duration in visits: 12  Treatment plan discussed with: patient    PT Pelvic Floor Subjective:   History of Present Illness:   Katarina has been having a lot of problems. She thought she had a UTI, was given antibiotics, had hematuria. She was feeling full in her abdomen. She talked to a surgeon and was referred to pelvic doctor. She has to pee again  right after she finishes. She likes to go for walks and hasn't been able to secondary to OAB. She tried medication but got sick from it. She is a part time  at a school. Sometimes she is able to delay urge and sometimes she can't. She must be able to lift things for work. She had stopped her water pill and still has increased frequency. She has cut back on her water intake. She drinks 3-4 bottles throughout the day. When she does Kegel exercises she has to go more. She has been doing Kegels in the car at stoplights. She feels like she voids every 10-15 minutes. Katarina states that urinary problem has been going on for a year, has got out of hand for the past few months. She can't tolerate long car rides due to urinary problem.  Social Support:     Lives in:  Multiple-level home    Lives with:  Spouse    Relationship status: /committed    Work status: employed part time    Life stress severity: mild  Diet and Exercise:    Diet:balanced nutrition    Exercise type: walking    Exercise frequency: daily    3-5 walks/day depending on schedule  OB/ gyn History    Gestational History:     Prior Pregnancy: No      Menstrual History:      Menopausal: menopause (last period was May 2019)  no hormone replacement therapy  Pelvic MD noted that her cervix is low, unsure if it is from pushing for BM or from lifting for work  Bladder Function:     Voiding Difficulties positive for: urgency (not going to avoid pain, has pain more with starting urination), frequent urination, hesitancy (sometimes it takes a while to start), straining and incomplete emptying       Voiding Difficulties comments:     Voiding frequency: every 15-30 minutes    Urinary leakage: no urine leakage    Urinary leakage not aggravated by: post-void dribble    Nocturia (episodes per night): 4 or more (5x/night, sometimes able to get back to sleep)    Painful urination: Yes (thought she had UTI, was hematuria, since 2013)      Fluid Intake Type:   "Water  Bowel Function:     Voiding DIfficulties: urgency, painful defecating, unfinished feeling after defecating and constipation      Bowel frequency: daily and every 3 days (varies)  Sexual Function:     Sexually Active:  Not sexually active    Pain during intercourse: Yes    Pain:     At best pain ratin    Location:  Pelvic    Progression:  Worsening  Diagnostic Tests:     Ultrasound: normal (has fibroid near uterus)  Treatments:   Upcoming doctor's appointment: yes  Date of next appointment: 2024  Patient Goals:     Patient goals for therapy:  Fully empty bladder or bowels, improved bladder or bowel function and improved quality of life    Other patient goals:  Learn some exercises to help      Objective       Abdominal Assessment:      Position: supine exam  Abdominal Assessment: Significant soft tissue restriction over B diaphragm  Diaphragmatic breath- moderate rib and belly excursion  Some tightness through abdomen, no TTP    Diastatis   Diastasis recti present: yes  3\" above umbilicus (# fingers): 4  Umbilicus (# fingers): 4  3\" below umbilicus (# fingers): 0  Connective tissue integrity at linea alba: firm  no tenderness at linea alba  able to engage transverse abdominis       General Perineum Exam:     General perineum exam comments: Palpated over top of clothing in sidelying  Able to distinguish between maximal and submax contraction    Visual Inspection of Perineum:   Excursion of perineal body in cephalad direction with contraction of pelvic floor muscles (PFM): good  Excursion of perineal body in caudal direction with relaxation of pelvic floor muscles (PFM): fair   Involuntary relaxation with bearing down: yes        Pelvic Floor Muscle Exam:             no pelvic floor exam consent given by patient              Precautions: IBS, PCOS, thyroid; high copay (1 man, 1 TE)      Manuals 24            TLF skin rolling nv            Diaphragm mobs nv                                    "   Neuro Re-Ed                                                                                                        Ther Ex             TA + chin lift reviewed            TA + sheet brace + SLR nv            TA + bridge nv            Piriformis stretch nv            SKC stretch nv            Happy baby nv            Child's pose nv            Supported lumbar flexion stretch nv            Ther Activity             Defecation mechanics reviewed                         Gait Training                                       Modalities

## 2024-05-09 ENCOUNTER — OFFICE VISIT (OUTPATIENT)
Dept: PHYSICAL THERAPY | Facility: CLINIC | Age: 54
End: 2024-05-09

## 2024-05-09 DIAGNOSIS — N81.2 UTEROVAGINAL PROLAPSE, INCOMPLETE: Primary | ICD-10-CM

## 2024-05-09 DIAGNOSIS — R35.0 URINARY FREQUENCY: ICD-10-CM

## 2024-05-09 PROCEDURE — 97140 MANUAL THERAPY 1/> REGIONS: CPT | Performed by: PHYSICAL THERAPIST

## 2024-05-09 PROCEDURE — 97110 THERAPEUTIC EXERCISES: CPT | Performed by: PHYSICAL THERAPIST

## 2024-05-09 NOTE — PROGRESS NOTES
"Daily Note     Today's date: 2024  Patient name: Katarina Copeland  : 1970  MRN: [id number]  Referring provider: Brenda Reyna PA-C  Dx:   Encounter Diagnosis     ICD-10-CM    1. Uterovaginal prolapse, incomplete  N81.2       2. Urinary frequency  R35.0                      Subjective: Katarina brings her wall Pilates book with her to review. She states that she went the most today, even without taking her water pill. Otherwise, frequency has been ok. She was able to walk when the weather was nice. She has been walking 1 time a day instead of 3-5 as she did before her urination problem. She has been able to implement some of the defecation mechanics from LV.      Objective: See treatment diary below      Assessment: Tolerated treatment well. Patient exhibited good technique with therapeutic exercises and would benefit from continued PT to decrease urinary frequency. She had significant soft tissue tension along bilateral diaphragm. She had good response to manuals with mild soft tissue restrictions noted in B TLF. Katarina demonstrated good form with exercises to stretch pelvic floor to help lengthen muscles to provide for greater contraction. Reviewed exercises from pt's Wall Pilates book, with most exercises appropriate for pt currently. She was given written copy of HEP and verbalized understanding.      Plan: Continue per plan of care.  Progress treatment as tolerated.       Precautions: IBS, PCOS, thyroid; high copay (1 man, 1 TE)      Manuals 24           TLF skin rolling nv ED           Diaphragm mobs nv ED                                     Neuro Re-Ed                                                                                                        Ther Ex             TA + chin lift reviewed reviewed           TA + SLR nv 10x ea           TA + bridge nv 10x            Piriformis stretch nv 30\" ea           SKC stretch nv 30\" ea           Happy baby nv 30\"           Child's pose nv 30\" " "          Supported lumbar flexion stretch nv 30\"           Ther Activity             Defecation mechanics reviewed            TA + PF + lift  nv           Gait Training                                       Modalities                                            "

## 2024-05-15 ENCOUNTER — APPOINTMENT (OUTPATIENT)
Dept: PHYSICAL THERAPY | Facility: CLINIC | Age: 54
End: 2024-05-15
Payer: COMMERCIAL

## 2024-05-20 ENCOUNTER — OFFICE VISIT (OUTPATIENT)
Dept: PHYSICAL THERAPY | Facility: CLINIC | Age: 54
End: 2024-05-20
Payer: COMMERCIAL

## 2024-05-20 DIAGNOSIS — R35.0 URINARY FREQUENCY: ICD-10-CM

## 2024-05-20 DIAGNOSIS — N81.2 UTEROVAGINAL PROLAPSE, INCOMPLETE: Primary | ICD-10-CM

## 2024-05-20 PROCEDURE — 97530 THERAPEUTIC ACTIVITIES: CPT | Performed by: PHYSICAL THERAPIST

## 2024-05-20 PROCEDURE — 97110 THERAPEUTIC EXERCISES: CPT | Performed by: PHYSICAL THERAPIST

## 2024-05-20 PROCEDURE — 97140 MANUAL THERAPY 1/> REGIONS: CPT | Performed by: PHYSICAL THERAPIST

## 2024-05-20 NOTE — PROGRESS NOTES
"Daily Note     Today's date: 2024  Patient name: Katarina Copeland  : 1970  MRN: [id number]  Referring provider: Brenda Reyna PA-C  Dx:   Encounter Diagnosis     ICD-10-CM    1. Uterovaginal prolapse, incomplete  N81.2       2. Urinary frequency  R35.0                      Subjective: Katarina states that she felt ok after LV. Frequency is also ok, has not been interrupting the work day as much. She has also been cutting back on water intake. She is able to delay urge when walking for fitness. She did some of the Pilates exercises. She had some discomfort yesterday morning when she was unable to have BM, relieved in the evening after she had BM at home. She states that overall everything is about the same.      Objective: See treatment diary below      Assessment: Tolerated treatment well. Patient exhibited good technique with therapeutic exercises and would benefit from continued PT to decrease urinary frequency. She continues to display significant soft tissue tension along bilateral diaphragm. Katarina had mild to moderate soft tissue restrictions in B TLF, improved following skin rolling to these areas. Continued with stretches to relax tight pelvic floor and allow for greater pelvic floor contraction. She demonstrated good understanding with patterning of TA pre-activation with lifting tasks.      Plan: Continue per plan of care.  Progress treatment as tolerated.       Precautions: IBS, PCOS, thyroid; high copay (1 man, 1 TE)      Manuals 24          TLF skin rolling nv ED ED          Diaphragm mobs nv ED ED                                    Neuro Re-Ed                                                                                                        Ther Ex             TA + chin lift reviewed reviewed np          TA + SLR nv 10x ea 10x ea          TA + bridge nv 10x  10x          Piriformis stretch nv 30\" ea 2x30\" ea          SKC stretch nv 30\" ea 2x30\" ea          Happy baby nv " "30\" 30\"          Child's pose nv 30\" 30\"          Supported lumbar flexion stretch nv 30\" 30\"          Ther Activity             Defecation mechanics reviewed            TA + PF + lift  nv Sm box 5x ea          Gait Training                                       Modalities                                            "

## 2024-06-05 ENCOUNTER — OFFICE VISIT (OUTPATIENT)
Dept: PHYSICAL THERAPY | Facility: CLINIC | Age: 54
End: 2024-06-05
Payer: COMMERCIAL

## 2024-06-05 DIAGNOSIS — N81.2 UTEROVAGINAL PROLAPSE, INCOMPLETE: Primary | ICD-10-CM

## 2024-06-05 DIAGNOSIS — R35.0 URINARY FREQUENCY: ICD-10-CM

## 2024-06-05 PROCEDURE — 97140 MANUAL THERAPY 1/> REGIONS: CPT | Performed by: PHYSICAL THERAPIST

## 2024-06-05 PROCEDURE — 97110 THERAPEUTIC EXERCISES: CPT | Performed by: PHYSICAL THERAPIST

## 2024-06-05 NOTE — PROGRESS NOTES
"Daily Note     Today's date: 2024  Patient name: Katarina Copeland  : 1970  MRN: [id number]  Referring provider: Brenda Reyna PA-C  Dx:   Encounter Diagnosis     ICD-10-CM    1. Uterovaginal prolapse, incomplete  N81.2       2. Urinary frequency  R35.0                      Subjective: Katarina states \"I don't know what I did to my knee.\" She had some leg exercises from prior PT for R knee that worked well. Then she went for a long walk and notes that it's been worse. The pain extends from the knee to the ankle and foot. She states that sometimes she will wrap her foot around the leg of the chair. She tried to bend her knee, walk up and down the steps, and will elevate and ice her knee. She denies popping, clicking, and catching in the joint. She got a knee brace online but had pain when she tried to apply it. She locates pain around the entire L knee. She can do some exercises but she can't do much and lift. She feels that she may have overdone it. She has pain with transitional movements and rolling in bed. She was able to walk for an hour without having to void. She has not been waking up at night to void. BM are going ok, has been drinking more water. She saw the pelvic doctor and will have urodynamics testing on 24.      Objective: See treatment diary below  Knee strength  Flexion: R 5/5, L 4/5 w/ pain  Extension: R 5/5, L 4/5  Palpation: tender over quad tendon, medial and lateral joint line, LCL > MCL  Girth at knee center: R 43.7 cm L 44.4 cm  Limited L patellar mobility in all planes, discomfort with superior/inferior glides  Knee AROM:  Flexion: R 120, L 97 limited by pain  Extension: R 0, L 0  Neg MCL and LCL testing, neg Lachman, neg anterior and posterior drawers    Assessment: Tolerated treatment well. Patient exhibited good technique with therapeutic exercises and would benefit from continued PT to decrease urinary frequency. Incorporated knee exercises into pelvic floor program to " "support even weight bearing to allow for balanced pelvic positioning. Katarina had good tolerance to new exercises and was able to perform them with good form and minimal cuing. Advised pt to elevate and apply CP at home. She demonstrated improved gait pattern at end of session and did not require a break to void throughout session.      Plan: Continue per plan of care.  Progress treatment as tolerated.       Precautions: IBS, PCOS, thyroid; high copay (1 man, 1 TE)      Manuals 4/26/24 5/9 5/20 6/5         TLF skin rolling nv ED ED nv         Diaphragm mobs nv ED ED nv         Knee assess    ED                      Neuro Re-Ed                                                                                                        Ther Ex             TA + chin lift reviewed reviewed np np         TA + SLR nv 10x ea 10x ea 15x ea         TA + bridge nv 10x  10x 15x         Piriformis stretch nv 30\" ea 2x30\" ea 30\"x3 ea         SKC stretch nv 30\" ea 2x30\" ea 30\"x3 ea         Happy baby nv 30\" 30\" 30\"         Child's pose nv 30\" 30\" nv         Supported lumbar flexion stretch nv 30\" 30\" nv         TA + quad sets    3\"x20         TA + heel slides    10x ea         TA + BKFO    15x ea nonalt         Hamstring stretch    30\"x3 ea         Ther Activity             Defecation mechanics reviewed            TA + PF + lift  nv Sm box 5x ea nv         Gait Training                                       Modalities                                            "

## 2024-06-19 ENCOUNTER — OFFICE VISIT (OUTPATIENT)
Dept: PHYSICAL THERAPY | Facility: CLINIC | Age: 54
End: 2024-06-19
Payer: COMMERCIAL

## 2024-06-19 DIAGNOSIS — N81.2 UTEROVAGINAL PROLAPSE, INCOMPLETE: Primary | ICD-10-CM

## 2024-06-19 DIAGNOSIS — R35.0 URINARY FREQUENCY: ICD-10-CM

## 2024-06-19 PROCEDURE — 97110 THERAPEUTIC EXERCISES: CPT | Performed by: PHYSICAL THERAPIST

## 2024-06-19 NOTE — PROGRESS NOTES
"Daily Note     Today's date: 2024  Patient name: Katarina Copeland  : 1970  MRN: [id number]  Referring provider: Brenda Reyna PA-C  Dx:   Encounter Diagnosis     ICD-10-CM    1. Uterovaginal prolapse, incomplete  N81.2       2. Urinary frequency  R35.0                      Subjective: Katarina states she can walk on her knee a little better today. She did better on Monday but then was \"hobbling\" most of Tuesday. She got Aspercreme. She used a massager on her quads and hamstrings and felt better this morning. She continues to have difficulty with bending down and squatting currently. She has to watch how she turns and sits to avoid pain. She did pelvic exercises and was able to bring her knees up a little closer following Aspercreme and massage. Pain continues to go down to the foot, had some swelling in the L ankle the other night. She continues to have some difficulty with rolling in bed. Yesterday it felt like the knee would give out. She only will go in the pool if her  is home due to concern for falling. She has the signs that she would be getting her period even though she hasn't had a period since 2019. She has more frequency when she would be having her period. She tried to Kegel during urination and was unable to stop the flow of urine. She also has not had good urge delay with quick flicks. Discussed trying diaphragmatic breath to delay urge. She will have urodynamics testing on 24. BM have been about the same, sometimes she can go and sometimes she has constipation. She does her pelvic exercises 3x/day.      Objective: See treatment diary below  Knee strength  Flexion: R 5/5, L 4/5 w/ pain  Extension: R 5/5, L 4/5  Palpation: tender over quad tendon, medial and lateral joint line, LCL > MCL  Girth at knee center: R 43.7 cm L 44.4 cm  Limited L patellar mobility in all planes, discomfort with superior/inferior glides  Knee AROM:  Flexion: R 120, L 97 limited by pain  Extension: R 0, " "L 0  Neg MCL and LCL testing, neg Lachman, neg anterior and posterior drawers    Assessment: Tolerated treatment well. Patient exhibited good technique with therapeutic exercises and would benefit from continued PT to decrease urinary frequency. L knee flexion AROM improved to 104 deg. Katarina has improved tolerance to stretches and exercises compared to LV. L knee center girth reduced to 42.1 cm. Katarina felt like she twisted her L knee with rolling on the table but was able to complete all exercises. She did not require a restroom break throughout session today. She continues to have good tolerance to stretches to relax the pelvic floor.    Plan: Continue per plan of care.  Progress treatment as tolerated.       Precautions: IBS, PCOS, thyroid; high copay (1 man, 1 TE)      Manuals 4/26/24 5/9 5/20 6/5 6/19        TLF skin rolling nv ED ED nv np        Diaphragm mobs nv ED ED nv np        Knee assess    ED                      Neuro Re-Ed                                                                                                        Ther Ex             TA + chin lift reviewed reviewed np np np        TA + SLR nv 10x ea 10x ea 15x ea 20x ea        TA + bridge nv 10x  10x 15x 20x        Piriformis stretch nv 30\" ea 2x30\" ea 30\"x3 ea 30\"x3 ea        SKC stretch nv 30\" ea 2x30\" ea 30\"x3 ea 30\"x3 ea        Happy baby nv 30\" 30\" 30\" 30\"        Child's pose nv 30\" 30\" nv np        Supported lumbar flexion stretch nv 30\" 30\" nv 30\"        TA + quad sets    3\"x20 3\"x20        TA + heel slides    10x ea 20x ea        TA + BKFO    15x ea nonalt 20x ea nonalt        Hamstring stretch    30\"x3 ea 30\"x3 ea        TA + abduction SLR     15x ea        Dead bugs     10x ea alt        Standing single leg circles at wall     5x ea dir B        Ther Activity             Defecation mechanics reviewed            TA + PF + lift  nv Sm box 5x ea nv nv        Gait Training                                       Modalities              "

## 2024-07-01 ENCOUNTER — OFFICE VISIT (OUTPATIENT)
Dept: PHYSICAL THERAPY | Facility: CLINIC | Age: 54
End: 2024-07-01
Payer: COMMERCIAL

## 2024-07-01 DIAGNOSIS — R35.0 URINARY FREQUENCY: ICD-10-CM

## 2024-07-01 DIAGNOSIS — N81.2 UTEROVAGINAL PROLAPSE, INCOMPLETE: Primary | ICD-10-CM

## 2024-07-01 PROCEDURE — 97530 THERAPEUTIC ACTIVITIES: CPT | Performed by: PHYSICAL THERAPIST

## 2024-07-01 PROCEDURE — 97110 THERAPEUTIC EXERCISES: CPT | Performed by: PHYSICAL THERAPIST

## 2024-07-01 NOTE — PROGRESS NOTES
"Daily Note     Today's date: 2024  Patient name: Katarina Copelnad  : 1970  MRN: [id number]  Referring provider: Brenda Reyna PA-C  Dx:   Encounter Diagnosis     ICD-10-CM    1. Uterovaginal prolapse, incomplete  N81.2       2. Urinary frequency  R35.0                      Subjective: Katarina states she was doing pretty well with her knee over the weekend, can walk on it better. She turned funny getting out of the car at her mom's house and had some pain afterward. She states that she is \"getting there\" with bending and squatting, was able to a little this morning and over the weekend. She locates the most pain over L patella and quad tendon. She has the most pain with getting up after sitting a long time. She hasn't been using the massager or her Aspercreme recently. She doesn't currently have the pain into her foot, only intermittently. Her urinary frequency was pretty good until yesterday and today. She had a follow up with urogynecology last week. She is doing ok at work.      Objective: See treatment diary below  Knee strength  Flexion: R 5/5, L 4/5 w/ pain  Extension: R 5/5, L 4/5  Palpation: tender over quad tendon, medial and lateral joint line, LCL > MCL  Girth at knee center: R 43.7 cm L 44.4 cm  Limited L patellar mobility in all planes, discomfort with superior/inferior glides  Knee AROM:  Flexion: R 120, L 97 limited by pain  Extension: R 0, L 0  Neg MCL and LCL testing, neg Lachman, neg anterior and posterior drawers    Assessment: Tolerated treatment well. Patient exhibited good technique with therapeutic exercises and would benefit from continued PT to decrease urinary frequency. Katarina reports that she occasionally has cramping in lower abdominals, unsure if it is related to PCOS/cysts. She has some muscular tightness in lower abdominals, no guarding, spasm, or tenderness to palpation. She did not have any leakage with lifting and demonstrated good form, TA contraction, and breathing. " "Katarina did well with all exercises, occasional ANGELA noted. She has improved ROM and mobility of L knee.    Plan: Continue per plan of care.  Progress treatment as tolerated.       Precautions: IBS, PCOS, thyroid; high copay (1 man, 1 TE)      Manuals 4/26/24 5/9 5/20 6/5 6/19 7/1       TLF skin rolling nv ED ED nv np np       Diaphragm mobs nv ED ED nv np np       Knee assess    ED                      Neuro Re-Ed                                                                                                        Ther Ex             TA + chin lift reviewed reviewed np np np np       TA + SLR nv 10x ea 10x ea 15x ea 20x ea 20x ea       TA + bridge nv 10x  10x 15x 20x 20x       Piriformis stretch nv 30\" ea 2x30\" ea 30\"x3 ea 30\"x3 ea 30\"x3 ea       SKC stretch nv 30\" ea 2x30\" ea 30\"x3 ea 30\"x3 ea 30\"x3 ea       Happy baby nv 30\" 30\" 30\" 30\" 30\"       Child's pose nv 30\" 30\" nv np np       Supported lumbar flexion stretch nv 30\" 30\" nv 30\" nv       TA + quad sets    3\"x20 3\"x20 3\"x20       TA + heel slides    10x ea 20x ea 20x ea       TA + BKFO    15x ea nonalt 20x ea nonalt 20x ea nonalt       Hamstring stretch    30\"x3 ea 30\"x3 ea nv       TA + abduction SLR     15x ea 20x ea       Dead bugs     10x ea alt 10x ea alt       Standing single leg circles at wall     5x ea dir B 10x2 ea dir B       Ther Activity             Defecation mechanics reviewed            TA + PF + lift  nv Sm box 5x ea nv nv 5x 5#  5x 10#       Gait Training                                       Modalities                                            "

## 2024-07-15 ENCOUNTER — OFFICE VISIT (OUTPATIENT)
Dept: PHYSICAL THERAPY | Facility: CLINIC | Age: 54
End: 2024-07-15
Payer: COMMERCIAL

## 2024-07-15 DIAGNOSIS — N81.2 UTEROVAGINAL PROLAPSE, INCOMPLETE: Primary | ICD-10-CM

## 2024-07-15 DIAGNOSIS — R35.0 URINARY FREQUENCY: ICD-10-CM

## 2024-07-15 PROCEDURE — 97110 THERAPEUTIC EXERCISES: CPT | Performed by: PHYSICAL THERAPIST

## 2024-07-15 NOTE — PROGRESS NOTES
Daily Note     Today's date: 7/15/2024  Patient name: Katarina Copeland  : 1970  MRN: [id number]  Referring provider: Brenda Reyna PA-C  Dx:   Encounter Diagnosis     ICD-10-CM    1. Uterovaginal prolapse, incomplete  N81.2       2. Urinary frequency  R35.0                      Subjective: Katarina called Dr. Jorge's office and will be seeing Dr. Parikh on 24. The knee pain comes and goes. She did a lot of walking and standing when she was in [place], noted limping and ankle swelling afterward. She felt good on Saturday, had pain with walking on . She has appointment with family doctor tomorrow to be evaluated. She alternates between reciprocal and non-reciprocal gait on stairs. Urinary frequency was rough going to [place], notes that she withheld fluids to avoid stopping on the way there. She also didn't take her water pills, feels that this may be why her ankles were swelling. She didn't have much frequency while she was away. She has been compliant with HEP.      Objective: See treatment diary below  L patella pain with inferior and medial glides  Tender over lateral pole of patella and quad tendon  Good patellar movement with quad set    Assessment: Tolerated treatment well. Patient exhibited good technique with therapeutic exercises and would benefit from continued PT to decrease urinary frequency. Katarina reports that she feels pain at end range of knee flexion with happy baby pose and feels as if the knee will lock up. She had good form with exercises otherwise, demonstrating good TA pre-activation and good quad set.     Plan: Continue per plan of care.  Progress treatment as tolerated.  Will await advisement from MD.     Precautions: IBS, PCOS, thyroid; high copay (1 man, 1 TE)      Manuals 24 5/9  6/ 7/1 7/15      TLF skin rolling nv ED ED nv np np np      Diaphragm mobs nv ED ED nv np np np      Knee assess    ED                      Neuro Re-Ed                         "                                                                                Ther Ex             TA + chin lift reviewed reviewed np np np np np      TA + SLR nv 10x ea 10x ea 15x ea 20x ea 20x ea 20x ea      TA + bridge nv 10x  10x 15x 20x 20x 20x      Piriformis stretch nv 30\" ea 2x30\" ea 30\"x3 ea 30\"x3 ea 30\"x3 ea 30\"x3 ea      SKC stretch nv 30\" ea 2x30\" ea 30\"x3 ea 30\"x3 ea 30\"x3 ea 30\"x3 ea      Happy baby nv 30\" 30\" 30\" 30\" 30\" 30\"      Child's pose nv 30\" 30\" nv np np np      Supported lumbar flexion stretch nv 30\" 30\" nv 30\" nv nv      TA + quad sets    3\"x20 3\"x20 3\"x20 3\"x20 ea      TA + heel slides    10x ea 20x ea 20x ea 15x ea, no strap      TA + BKFO    15x ea nonalt 20x ea nonalt 20x ea nonalt 20x ea nonalt      Hamstring stretch    30\"x3 ea 30\"x3 ea nv nv      TA + abduction SLR     15x ea 20x ea 20x ea      Dead bugs     10x ea alt 10x ea alt 15x ea alt      Standing single leg circles at wall     5x ea dir B 10x2 ea dir B 5x4 ea dir B      Ther Activity             Defecation mechanics reviewed            TA + PF + lift  nv Sm box 5x ea nv nv 5x 5#  5x 10# nv      Gait Training                                       Modalities                                            "

## 2024-07-29 ENCOUNTER — OFFICE VISIT (OUTPATIENT)
Dept: PHYSICAL THERAPY | Facility: CLINIC | Age: 54
End: 2024-07-29
Payer: COMMERCIAL

## 2024-07-29 DIAGNOSIS — N81.2 UTEROVAGINAL PROLAPSE, INCOMPLETE: Primary | ICD-10-CM

## 2024-07-29 DIAGNOSIS — R35.0 URINARY FREQUENCY: ICD-10-CM

## 2024-07-29 PROCEDURE — 97140 MANUAL THERAPY 1/> REGIONS: CPT | Performed by: PHYSICAL THERAPIST

## 2024-07-29 NOTE — PROGRESS NOTES
Daily Note     Today's date: 2024  Patient name: Katarina Copeland  : 1970  MRN: [id number]  Referring provider: Brenda Reyna PA-C  Dx:   Encounter Diagnosis     ICD-10-CM    1. Uterovaginal prolapse, incomplete  N81.2       2. Urinary frequency  R35.0                      Subjective: Katarina saw Dr. Parikh last week. He feels that she does not have prolapse. He feels that the urethra is not closing right. She reports feeling a fullness but does not have anything coming out. She has 2 fibroids, both by the uterus. She has a follow up in October. She saw ortho MD on Friday. She had an xray from PCP, was diagnosed with arthritis in the knee. She had a shot of cortisone in her knee. Her knee was bleeding and she was unaware until she got home. She doesn't have to return to ortho unless she has problems. She was given Meloxicam for pain as needed. She has been using Voltaren gel. She felt better with walking by Saturday. She feels pain after her work shift today and feels that Voltaren helps with pain.      Objective: See treatment diary below  External:  Skin, sensation, and cough reflex intact  Anal wink brisk B  No prolapse visualized  Fair contract/fair relax/poor bulge  Adductor substitution with maximal contraction, able to reduce with submax contraction    Internal:  1st layer tight in L upper quadrant  Tightness in B OI, L > R  Difficulty with activation of periurethral muscles L > R  No tenderness noted in 3rd layer      Assessment: Tolerated treatment well. Patient exhibited good technique with therapeutic exercises and would benefit from continued PT to decrease urinary frequency. Pt was offered option of second person in the room prior to physical examination. Pt was explained purpose and process of assessment and provided consent prior to initiation of physical exam. Katarina has difficulty with isolating pelvic floor contraction, having adductor substitution with contraction. She has good  "contraction of posterior muscles, difficulty with isolation of anterior muscles. L periurethral muscles have difficulty with relaxation and full contraction. Plan to focus on relaxation of L side, particularly layers 1 and 2. No prolapse visualized with increase in IAP.      Plan: Continue per plan of care.  Progress treatment as tolerated.         Precautions: IBS, PCOS, thyroid; high copay (1 man, 1 TE)      Manuals 4/26/24 5/9 5/20 6/5 6/19 7/1 7/15 7/29     TLF skin rolling nv ED ED nv np np np      Diaphragm mobs nv ED ED nv np np np      Knee assess    ED         Internal assess        ED     Neuro Re-Ed                                                                                                        Ther Ex             TA + chin lift reviewed reviewed np np np np np      TA + SLR nv 10x ea 10x ea 15x ea 20x ea 20x ea 20x ea      TA + bridge nv 10x  10x 15x 20x 20x 20x      Piriformis stretch nv 30\" ea 2x30\" ea 30\"x3 ea 30\"x3 ea 30\"x3 ea 30\"x3 ea      SKC stretch nv 30\" ea 2x30\" ea 30\"x3 ea 30\"x3 ea 30\"x3 ea 30\"x3 ea      Happy baby nv 30\" 30\" 30\" 30\" 30\" 30\"      Child's pose nv 30\" 30\" nv np np np      Supported lumbar flexion stretch nv 30\" 30\" nv 30\" nv nv      TA + quad sets    3\"x20 3\"x20 3\"x20 3\"x20 ea      TA + heel slides    10x ea 20x ea 20x ea 15x ea, no strap      TA + BKFO    15x ea nonalt 20x ea nonalt 20x ea nonalt 20x ea nonalt      Hamstring stretch    30\"x3 ea 30\"x3 ea nv nv      TA + abduction SLR     15x ea 20x ea 20x ea      Dead bugs     10x ea alt 10x ea alt 15x ea alt      Standing single leg circles at wall     5x ea dir B 10x2 ea dir B 5x4 ea dir B      Ther Activity             Defecation mechanics reviewed            TA + PF + lift  nv Sm box 5x ea nv nv 5x 5#  5x 10# nv      Gait Training                                       Modalities                                            "

## 2024-08-01 ENCOUNTER — EVALUATION (OUTPATIENT)
Dept: PHYSICAL THERAPY | Facility: CLINIC | Age: 54
End: 2024-08-01
Payer: COMMERCIAL

## 2024-08-01 DIAGNOSIS — M25.562 LEFT KNEE PAIN, UNSPECIFIED CHRONICITY: Primary | ICD-10-CM

## 2024-08-01 PROCEDURE — 97161 PT EVAL LOW COMPLEX 20 MIN: CPT | Performed by: PHYSICAL THERAPIST

## 2024-08-01 PROCEDURE — 97110 THERAPEUTIC EXERCISES: CPT | Performed by: PHYSICAL THERAPIST

## 2024-08-01 NOTE — PROGRESS NOTES
PT Evaluation     Today's date: 2024  Patient name: Katarina Copeland  : 1970  MRN: [id number]  Referring provider: Shruthi Son PA-C  Dx:   Encounter Diagnosis     ICD-10-CM    1. Left knee pain, unspecified chronicity  M25.562                      Assessment  Impairments: abnormal coordination, abnormal or restricted ROM, activity intolerance, impaired physical strength, lacks appropriate home exercise program and pain with function    Assessment details: Pt is a pleasant 53 y.o. female presenting to outpatient physical therapy with Left knee pain, unspecified chronicity  (primary encounter diagnosis).     Pt presents with pain, decreased range of motion, decreased strength, and decreased tolerance to activity.     Pt is a good candidate for outpatient physical therapy and would benefit from skilled physical therapy to address limitations and to achieve goals. Thank you for this referral.   Understanding of Dx/Px/POC: good     Prognosis: good    Goals  Short-Term Goals (4 weeks)   1. Patient will decrease worst rating of pain by 25% to improve quality of life.  2. Patient will increase strength by 1/2 MMT to improve quality of life with improved efficiency of daily activities.  3. Patient will improve ROM by 25% indicating improved mobility of affected area.    Long-Term Goals (8 weeks)   1. Patient will decrease pain by 50% at worst in comparison to IE indicating significant reduction in pain and improved quality of life.  2. Patient will demonstrate strength WFL compared to IE levels indicating ability to independently manage pain symptoms to accomplish daily activities.   3. Patient will be independent with HEP with good form accomplished.      Plan  Patient would benefit from: PT eval and skilled PT  Planned modality interventions: cryotherapy and thermotherapy: hydrocollator packs    Planned therapy interventions: IADL retraining, body mechanics training, flexibility, functional ROM exercises,  home exercise program, neuromuscular re-education, manual therapy, postural training, strengthening, stretching, therapeutic activities, therapeutic exercise, joint mobilization, IASTM, abdominal trunk stabilization, kinesiology taping and balance    Frequency: 2x week  Duration in weeks: 8  Treatment plan discussed with: patient        Subjective Evaluation    History of Present Illness  Mechanism of injury: Katarina woke up one day and couldn't walk on her L LE, couldn't walk fast, do steps, had to rely on UE. She was climbing stairs non-reciprocally. She had difficulty with bending and lunging which she must be able to do for work. She had an injection in her knee last Friday, notes that she has improved symptoms since the shot. Her pain is not as bad and more intermittent. She is using topical Voltaren to help with pain. She was not given a brace at any time. She has tried them in the past and had immediate return of pain. She is not in any new shoes at this time. Xray showed arthritis. Currently she has been able to return to her activities. Sometimes she has pain over popliteal space and medial side. She has improved ability to walk down a ramp. She is concerned about walking on the beach and walking long distances while on vacation.  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     Right Knee   WFL: medial, lateral, superior and inferior    Strength/Myotome Testing     Left Hip   Planes of Motion   Flexion: 5  External rotation: 5  Internal rotation: 5    Right Hip   Planes of Motion   Flexion: 5  External rotation: 5  Internal rotation: 5    Left Knee   Flexion: 4-  Extension: 4+    Right Knee   Flexion: 4+  Extension: 4+    Left Ankle/Foot   Dorsiflexion: 5  Plantar flexion: 5  Inversion: 5  Eversion: 5    Right Ankle/Foot   Dorsiflexion: 5  Plantar flexion: 5  Inversion: 5  Eversion: 5    Ambulation     Ambulation: Stairs   Ascend stairs: independent  Pattern: reciprocal  Railings: without rails  Descend stairs: independent  Pattern: reciprocal  Railings: without rails    Observational Gait   Gait: within functional limits   Walking speed and stride length within functional limits.   Base of support: normal    Functional Assessment      Squat    Left within functional limits and right within functional limits.     Single Leg Stance   Left: 26 seconds  Right: 22 seconds    Comments  SL HR- able to do 5x ea side             Precautions:   Patient Active Problem List   Diagnosis    Dense breast tissue    Increased risk of breast cancer    Family history of malignant neoplasm of breast    BRCA negative    Screening mammogram for high-risk patient           Manuals 8/1                                                                Neuro Re-Ed             SLS nv            Rockerboard balance AP/ML nv                                                                             Ther Ex             SLR 10x            bridge 10x            Standing HS curl 10x            Mini squat 10x            bike nv            Step ups fwd/lat nv            Step downs nv            Multihip flex/abd/ext nv            Ther Activity                                       Gait Training                                        Modalities

## 2024-08-01 NOTE — LETTER
2024    Shruthi Son PA-C  [address]  [address]  [place] [address]    Patient: Katarina Copeland   YOB: 1970   Date of Visit: 2024     Encounter Diagnosis     ICD-10-CM    1. Left knee pain, unspecified chronicity  M25.562           Dear Dr. Son:    Thank you for your recent referral of Katarina Copeland. Please review the attached evaluation summary from Katarina's recent visit.     Please verify that you agree with the plan of care by signing the attached order.     If you have any questions or concerns, please do not hesitate to call.     I sincerely appreciate the opportunity to share in the care of one of your patients and hope to have another opportunity to work with you in the near future.       Sincerely,    Jeny Piña, PT      Referring Provider:      I certify that I have read the below Plan of Care and certify the need for these services furnished under this plan of treatment while under my care.                    Shruthi Son PA-C  [address]  [address]  [place] [address]  Via Fax: [phone]          PT Evaluation     Today's date: 2024  Patient name: Katarina Copeland  : 1970  MRN: [id number]  Referring provider: Shruthi Son PA-C  Dx:   Encounter Diagnosis     ICD-10-CM    1. Left knee pain, unspecified chronicity  M25.562                      Assessment  Impairments: abnormal coordination, abnormal or restricted ROM, activity intolerance, impaired physical strength, lacks appropriate home exercise program and pain with function    Assessment details: Pt is a pleasant 53 y.o. female presenting to outpatient physical therapy with Left knee pain, unspecified chronicity  (primary encounter diagnosis).     Pt presents with pain, decreased range of motion, decreased strength, and decreased tolerance to activity.     Pt is a good candidate for outpatient physical therapy and would benefit from skilled physical therapy to  address limitations and to achieve goals. Thank you for this referral.   Understanding of Dx/Px/POC: good     Prognosis: good    Goals  Short-Term Goals (4 weeks)   1. Patient will decrease worst rating of pain by 25% to improve quality of life.  2. Patient will increase strength by 1/2 MMT to improve quality of life with improved efficiency of daily activities.  3. Patient will improve ROM by 25% indicating improved mobility of affected area.    Long-Term Goals (8 weeks)   1. Patient will decrease pain by 50% at worst in comparison to IE indicating significant reduction in pain and improved quality of life.  2. Patient will demonstrate strength WFL compared to IE levels indicating ability to independently manage pain symptoms to accomplish daily activities.   3. Patient will be independent with HEP with good form accomplished.      Plan  Patient would benefit from: PT eval and skilled PT  Planned modality interventions: cryotherapy and thermotherapy: hydrocollator packs    Planned therapy interventions: IADL retraining, body mechanics training, flexibility, functional ROM exercises, home exercise program, neuromuscular re-education, manual therapy, postural training, strengthening, stretching, therapeutic activities, therapeutic exercise, joint mobilization, IASTM, abdominal trunk stabilization, kinesiology taping and balance    Frequency: 2x week  Duration in weeks: 8  Treatment plan discussed with: patient        Subjective Evaluation    History of Present Illness  Mechanism of injury: Katarina woke up one day and couldn't walk on her L LE, couldn't walk fast, do steps, had to rely on UE. She was climbing stairs non-reciprocally. She had difficulty with bending and lunging which she must be able to do for work. She had an injection in her knee last Friday, notes that she has improved symptoms since the shot. Her pain is not as bad and more intermittent. She is using topical Voltaren to help with pain. She was not  given a brace at any time. She has tried them in the past and had immediate return of pain. She is not in any new shoes at this time. Xray showed arthritis. Currently she has been able to return to her activities. Sometimes she has pain over popliteal space and medial side. She has improved ability to walk down a ramp. She is concerned about walking on the beach and walking long distances while on vacation.  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     Right Knee   WFL: medial, lateral, superior and inferior    Strength/Myotome Testing     Left Hip   Planes of Motion   Flexion: 5  External rotation: 5  Internal rotation: 5    Right Hip   Planes of Motion   Flexion: 5  External rotation: 5  Internal rotation: 5    Left Knee   Flexion: 4-  Extension: 4+    Right Knee   Flexion: 4+  Extension: 4+    Left Ankle/Foot   Dorsiflexion: 5  Plantar flexion: 5  Inversion: 5  Eversion: 5    Right Ankle/Foot   Dorsiflexion: 5  Plantar flexion: 5  Inversion: 5  Eversion: 5    Ambulation     Ambulation: Stairs   Ascend stairs: independent  Pattern: reciprocal  Railings: without rails  Descend stairs: independent  Pattern: reciprocal  Railings: without rails    Observational Gait   Gait: within  functional limits   Walking speed and stride length within functional limits.   Base of support: normal    Functional Assessment      Squat    Left within functional limits and right within functional limits.     Single Leg Stance   Left: 26 seconds  Right: 22 seconds    Comments  SL HR- able to do 5x ea side             Precautions:   Patient Active Problem List   Diagnosis   • Dense breast tissue   • Increased risk of breast cancer   • Family history of malignant neoplasm of breast   • BRCA negative   • Screening mammogram for high-risk patient           Manuals 8/1                                                                Neuro Re-Ed             SLS nv            Rockerboard balance AP/ML nv                                                                             Ther Ex             SLR 10x            bridge 10x            Standing HS curl 10x            Mini squat 10x            bike nv            Step ups fwd/lat nv            Step downs nv            Multihip flex/abd/ext nv            Ther Activity                                       Gait Training                                       Modalities

## 2024-08-05 ENCOUNTER — OFFICE VISIT (OUTPATIENT)
Dept: PHYSICAL THERAPY | Facility: CLINIC | Age: 54
End: 2024-08-05
Payer: COMMERCIAL

## 2024-08-05 DIAGNOSIS — M25.562 LEFT KNEE PAIN, UNSPECIFIED CHRONICITY: Primary | ICD-10-CM

## 2024-08-05 PROCEDURE — 97110 THERAPEUTIC EXERCISES: CPT | Performed by: PHYSICAL THERAPIST

## 2024-08-05 PROCEDURE — 97112 NEUROMUSCULAR REEDUCATION: CPT | Performed by: PHYSICAL THERAPIST

## 2024-08-05 NOTE — PROGRESS NOTES
"Daily Note     Today's date: 2024  Patient name: Katarina Copeland  : 1970  MRN: [id number]  Referring provider: Shruthi Son PA-C  Dx:   Encounter Diagnosis     ICD-10-CM    1. Left knee pain, unspecified chronicity  M25.562                      Subjective: Katarina states that her L knee is feeling pretty good so far.      Objective: See treatment diary below      Assessment: Tolerated treatment well. Patient demonstrated fatigue post treatment, exhibited good technique with therapeutic exercises, and would benefit from continued PT to improve L knee strength and ROM. Katarina noted at the end of the standing exercises that the [place] continue to hurt her feet. She will switch into her remaining pair of [employer]. She had good form with standing exercises. She had greatest challenge with step downs, SLS, and side-to-side balance. Will assess soreness at NV and progress HEP as tolerated.      Plan: Continue per plan of care.  Progress treatment as tolerated.       Precautions:   Patient Active Problem List   Diagnosis    Dense breast tissue    Increased risk of breast cancer    Family history of malignant neoplasm of breast    BRCA negative    Screening mammogram for high-risk patient           Manuals            L knee PROM  ED           L knee patella mob  Gr 3-4 ED                                     Neuro Re-Ed             SLS nv 30\"x3           Rockerboard balance AP/ML nv 30\"x3 ea                                                                            Ther Ex             SLR 10x 20x           bridge 10x 20x           Standing HS curl 10x 20x            Mini squat 10x 20x           bike nv 5 min L1           Step ups fwd/lat nv 1R 15x ea           Step downs nv 1R 15x           Multihip flex/abd/ext nv L 40# 15x ea           LAQ  3# 3\"x20           L abduction SLR  20x           clamshells  20x           Reverse clams  20x           Ther Activity                                       Gait " Training                                       Modalities

## 2024-08-08 ENCOUNTER — OFFICE VISIT (OUTPATIENT)
Dept: PHYSICAL THERAPY | Facility: CLINIC | Age: 54
End: 2024-08-08
Payer: COMMERCIAL

## 2024-08-08 DIAGNOSIS — M25.562 LEFT KNEE PAIN, UNSPECIFIED CHRONICITY: Primary | ICD-10-CM

## 2024-08-08 PROCEDURE — 97110 THERAPEUTIC EXERCISES: CPT | Performed by: PHYSICAL THERAPIST

## 2024-08-08 PROCEDURE — 97112 NEUROMUSCULAR REEDUCATION: CPT | Performed by: PHYSICAL THERAPIST

## 2024-08-08 NOTE — PROGRESS NOTES
"Daily Note     Today's date: 2024  Patient name: Katarina Copeland  : 1970  MRN: [id number]  Referring provider: Shruthi Son PA-C  Dx:   Encounter Diagnosis     ICD-10-CM    1. Left knee pain, unspecified chronicity  M25.562                      Subjective: Katarina reports some pain in her L knee today, unsure if it is related to rainy weather or wearing her Hokas for a few hours earlier today.      Objective: See treatment diary below      Assessment: Tolerated treatment well. Patient demonstrated fatigue post treatment, exhibited good technique with therapeutic exercises, and would benefit from continued PT to improve L knee strength and ROM. Katarina demonstrates improved static balance with SLS today. She had good tolerance to progression of exercises. She notes that her knee feels better at end of session compared to the start. Updated HEP with pt verbalizing understanding.    Plan: Continue per plan of care.  Progress treatment as tolerated.       Precautions:   Patient Active Problem List   Diagnosis    Dense breast tissue    Increased risk of breast cancer    Family history of malignant neoplasm of breast    BRCA negative    Screening mammogram for high-risk patient           Manuals           L knee PROM  ED ED          L knee patella mob  Gr 3-4 ED ED, gr 3-4                                    Neuro Re-Ed             SLS nv 30\"x3 30\"x3 ea          Rockerboard balance AP/ML nv 30\"x3 ea 30\"x3 ea                                                                           Ther Ex             SLR 10x 20x 20x          bridge 10x 20x 20x          Standing HS curl 10x 20x  30x          Mini squat 10x 20x 30x          bike nv 5 min L1 5 min L1          Step ups fwd/lat nv 1R 15x ea 1R 20x ea          Step downs nv 1R 15x 1R 20x          Multihip flex/abd/ext nv L 40# 15x ea L 40# 20x ea          LAQ  3# 3\"x20 3# 3\"x30          L abduction SLR  20x 20x          clamshells  20x 20x        "   Reverse clams  20x 20x          Leg press   75# 20x          Ther Activity                                       Gait Training                                       Modalities

## 2024-08-19 ENCOUNTER — OFFICE VISIT (OUTPATIENT)
Dept: PHYSICAL THERAPY | Facility: CLINIC | Age: 54
End: 2024-08-19
Payer: COMMERCIAL

## 2024-08-19 DIAGNOSIS — M25.562 LEFT KNEE PAIN, UNSPECIFIED CHRONICITY: Primary | ICD-10-CM

## 2024-08-19 PROCEDURE — 97110 THERAPEUTIC EXERCISES: CPT | Performed by: PHYSICAL THERAPIST

## 2024-08-19 PROCEDURE — 97112 NEUROMUSCULAR REEDUCATION: CPT | Performed by: PHYSICAL THERAPIST

## 2024-08-19 NOTE — PROGRESS NOTES
"Daily Note     Today's date: 2024  Patient name: Katarina Copeland  : 1970  MRN: [id number]  Referring provider: Shruthi Son PA-C  Dx:   Encounter Diagnosis     ICD-10-CM    1. Left knee pain, unspecified chronicity  M25.562                      Subjective: Katarina was away at the beach last week. She reports that she had minor pain in her L knee from sitting for a long time in the car. After that, she started getting more pain in the L hip with walking and needed to take rest breaks to help with pain. She feels that it may have been related to increased walking while she was away.      Objective: See treatment diary below      Assessment: Tolerated treatment well. Patient demonstrated fatigue post treatment, exhibited good technique with therapeutic exercises, and would benefit from continued PT to improve L knee strength and ROM. Katarina continues to improve with static balance. She had occasional increase in pain with exercises but was able to complete all reps. She has pain over L quad tendon at end range of passive knee flexion, still able to achieve full ROM. Discussed trying balance exercises at home on her rebounder. She verbalized understanding and will attempt to progress balance to unsteady surface.    Plan: Continue per plan of care.  Progress treatment as tolerated.       Precautions:   Patient Active Problem List   Diagnosis    Dense breast tissue    Increased risk of breast cancer    Family history of malignant neoplasm of breast    BRCA negative    Screening mammogram for high-risk patient           Manuals          L knee PROM  ED ED ED         L knee patella mob  Gr 3-4 ED ED, gr 3-4 ED, gr 3-4                                   Neuro Re-Ed             SLS nv 30\"x3 30\"x3 ea 30\"x3 ea         Rockerboard balance AP/ML nv 30\"x3 ea 30\"x3 ea 30\"x3 ea         sidestepping    nv                                                             Ther Ex             SLR 10x 20x 20x 20x  " "       bridge 10x 20x 20x 30x         Standing HS curl 10x 20x  30x 30x         Mini squat 10x 20x 30x 30x         bike nv 5 min L1 5 min L1 5 min L1         Step ups fwd/lat nv 1R 15x ea 1R 20x ea 1R 20x ea         Step downs nv 1R 15x 1R 20x 1R 30x         Multihip flex/abd/ext nv L 40# 15x ea L 40# 20x ea L 40# 20x ea         LAQ  3# 3\"x20 3# 3\"x30 3# 3\"x30         L abduction SLR  20x 20x 20x          clamshells  20x 20x 30x         Reverse clams  20x 20x 30x         Leg press seat 5   75# 20x 75# 20x         Ther Activity                                       Gait Training                                       Modalities                                            "

## 2024-08-23 ENCOUNTER — OFFICE VISIT (OUTPATIENT)
Dept: PHYSICAL THERAPY | Facility: CLINIC | Age: 54
End: 2024-08-23
Payer: COMMERCIAL

## 2024-08-23 DIAGNOSIS — M25.562 LEFT KNEE PAIN, UNSPECIFIED CHRONICITY: Primary | ICD-10-CM

## 2024-08-23 PROCEDURE — 97110 THERAPEUTIC EXERCISES: CPT | Performed by: PHYSICAL THERAPIST

## 2024-08-23 PROCEDURE — 97140 MANUAL THERAPY 1/> REGIONS: CPT | Performed by: PHYSICAL THERAPIST

## 2024-08-23 PROCEDURE — 97112 NEUROMUSCULAR REEDUCATION: CPT | Performed by: PHYSICAL THERAPIST

## 2024-08-23 NOTE — PROGRESS NOTES
"Daily Note     Today's date: 2024  Patient name: Katarina Copeland  : 1970  MRN: [id number]  Referring provider: Shruthi Son PA-C  Dx:   Encounter Diagnosis     ICD-10-CM    1. Left knee pain, unspecified chronicity  M25.562                      Subjective: Katarina states that her L hip feels better than it did earlier this week. She worked earlier today and has continued pain in L knee that she locates over L patellar tendon, patella, and popliteal fossa.      Objective: See treatment diary below      Assessment: Tolerated treatment well. Patient demonstrated fatigue post treatment, exhibited good technique with therapeutic exercises, and would benefit from continued PT to improve L knee strength and ROM. Katarina had good tolerance to progression of exercises. She continues to note discomfort at end range of patellar superior and inferior glides. She did not have any increase in hip pain throughout session today. Continue to progress as able.    Plan: Continue per plan of care.  Progress treatment as tolerated.       Precautions:   Patient Active Problem List   Diagnosis    Dense breast tissue    Increased risk of breast cancer    Family history of malignant neoplasm of breast    BRCA negative    Screening mammogram for high-risk patient           Manuals         L knee PROM  ED ED ED ED        L knee patella mob  Gr 3-4 ED ED, gr 3-4 ED, gr 3-4 ED, gr 3-4                                  Neuro Re-Ed             SLS nv 30\"x3 30\"x3 ea 30\"x3 ea 30\"x3 ea        Rockerboard balance AP/ML nv 30\"x3 ea 30\"x3 ea 30\"x3 ea 30\"x3 ea        sidestepping    nv 20' x2 ea                                                            Ther Ex             SLR 10x 20x 20x 20x 3x10 ea        bridge 10x 20x 20x 30x 30x        Standing HS curl 10x 20x  30x 30x 2# 20x        Mini squat 10x 20x 30x 30x 30x        bike nv 5 min L1 5 min L1 5 min L1 5 min L1        Step ups fwd/lat nv 1R 15x ea 1R 20x ea 1R " "20x ea 2R 20x ea        Step downs nv 1R 15x 1R 20x 1R 30x 1R 30x        Multihip flex/abd/ext nv L 40# 15x ea L 40# 20x ea L 40# 20x ea L 55# 20x ea        LAQ  3# 3\"x20 3# 3\"x30 3# 3\"x30 3# 3\"x30        L abduction SLR  20x 20x 20x  3x10        clamshells  20x 20x 30x 30x        Reverse clams  20x 20x 30x 30x        Leg press seat 5   75# 20x 75# 20x 85# 20x        Ther Activity                                       Gait Training                                       Modalities                                            "

## 2024-08-29 ENCOUNTER — OFFICE VISIT (OUTPATIENT)
Dept: PHYSICAL THERAPY | Facility: CLINIC | Age: 54
End: 2024-08-29
Payer: COMMERCIAL

## 2024-08-29 DIAGNOSIS — M25.562 LEFT KNEE PAIN, UNSPECIFIED CHRONICITY: Primary | ICD-10-CM

## 2024-08-29 PROCEDURE — 97112 NEUROMUSCULAR REEDUCATION: CPT

## 2024-08-29 PROCEDURE — 97110 THERAPEUTIC EXERCISES: CPT

## 2024-08-29 NOTE — PROGRESS NOTES
"Daily Note     Today's date: 2024  Patient name: Katarina Copeland  : 1970  MRN: [id number]  Referring provider: Shruthi Son PA-C  Dx:   Encounter Diagnosis     ICD-10-CM    1. Left knee pain, unspecified chronicity  M25.562             Start Time: 1514  Stop Time: 1603  Total time in clinic (min): 49 minutes    Subjective: Patient states that she continues to see improvement weekly and the worse her pain has been the last week has been a 2/10 on the VAS. Reports that navigating steps for her work is when she has the most amount of discomfort, but does not get to the point where she can not participate. Reports being happy with her progress.     Objective: See treatment diary below      Assessment: Tolerated treatment well. Patient demonstrated fatigue post treatment, exhibited good technique with therapeutic exercises, and would benefit from continued PT to address her deficits. Continue to note improvement in her SL balance and was able to attempt balancing on foam for the first time today and had no pain with this and did not have to correct herself at all with the L LE. Demonstrated good quad control with step ups and side stepping, although still ambulates with mild antalgic pattern intermittently. Progress as tolerated.     Plan: Continue per plan of care.  Progress treatment as tolerated.       Precautions:   Patient Active Problem List   Diagnosis    Dense breast tissue    Increased risk of breast cancer    Family history of malignant neoplasm of breast    BRCA negative    Screening mammogram for high-risk patient           Manuals        L knee PROM  ED ED ED ED HB       L knee patella mob  Gr 3-4 ED ED, gr 3-4 ED, gr 3-4 ED, gr 3-4 HB   gr 3-4                                 Neuro Re-Ed             SLS nv 30\"x3 30\"x3 ea 30\"x3 ea 30\"x3 ea 30\"x2ea     30\"x1 ea on foam        Rockerboard balance AP/ML nv 30\"x3 ea 30\"x3 ea 30\"x3 ea 30\"x3 ea 30\"x3 ea       sidestepping   " " nv 20' x2 ea 20' x2                                                           Ther Ex             SLR 10x 20x 20x 20x 3x10 ea 3x10       bridge 10x 20x 20x 30x 30x 30x       Standing HS curl 10x 20x  30x 30x 2# 20x 2# 20x       Mini squat 10x 20x 30x 30x 30x 30x       bike nv 5 min L1 5 min L1 5 min L1 5 min L1 5min L1       Step ups fwd/lat nv 1R 15x ea 1R 20x ea 1R 20x ea 2R 20x ea 2 R 20x ea       Step downs nv 1R 15x 1R 20x 1R 30x 1R 30x 1R 30x        Multihip flex/abd/ext nv L 40# 15x ea L 40# 20x ea L 40# 20x ea L 55# 20x ea L 55# 20x ea       LAQ  3# 3\"x20 3# 3\"x30 3# 3\"x30 3# 3\"x30 3# 3\"x30       L abduction SLR  20x 20x 20x  3x10 3x10       clamshells  20x 20x 30x 30x 30x       Reverse clams  20x 20x 30x 30x 30x        Leg press seat 5   75# 20x 75# 20x 85# 20x 85# 20x       Ther Activity                                       Gait Training                                       Modalities                                            "

## 2024-09-03 ENCOUNTER — TELEPHONE (OUTPATIENT)
Age: 54
End: 2024-09-03

## 2024-09-06 ENCOUNTER — OFFICE VISIT (OUTPATIENT)
Dept: PHYSICAL THERAPY | Facility: CLINIC | Age: 54
End: 2024-09-06
Payer: COMMERCIAL

## 2024-09-06 DIAGNOSIS — M25.562 LEFT KNEE PAIN, UNSPECIFIED CHRONICITY: Primary | ICD-10-CM

## 2024-09-06 PROCEDURE — 97110 THERAPEUTIC EXERCISES: CPT | Performed by: PHYSICAL THERAPIST

## 2024-09-06 PROCEDURE — 97140 MANUAL THERAPY 1/> REGIONS: CPT | Performed by: PHYSICAL THERAPIST

## 2024-09-06 PROCEDURE — 97112 NEUROMUSCULAR REEDUCATION: CPT | Performed by: PHYSICAL THERAPIST

## 2024-09-06 NOTE — PROGRESS NOTES
"Daily Note     Today's date: 2024  Patient name: Katarina Copeland  : 1970  MRN: [id number]  Referring provider: Shruthi Son PA-C  Dx:   Encounter Diagnosis     ICD-10-CM    1. Left knee pain, unspecified chronicity  M25.562                        Subjective: Patient states that she has had increased pain. She is unsure if it's related to a new pair of shoes, weather, increased activity with stairclimbing at school, or the way that she is sleeping. She is frustrated with the regression in progress since she had been doing better.      Objective: See treatment diary below      Assessment: Tolerated treatment well. Patient demonstrated fatigue post treatment, exhibited good technique with therapeutic exercises, and would benefit from continued PT to address her deficits. Katarina continues to improve her balance without increase in pain. She had good form throughout exercises but had some increase in pain with mini squats. She did not have any loss of mobility with L knee PROM.      Plan: Continue per plan of care.  Progress treatment as tolerated.       Precautions:   Patient Active Problem List   Diagnosis    Dense breast tissue    Increased risk of breast cancer    Family history of malignant neoplasm of breast    BRCA negative    Screening mammogram for high-risk patient           Manuals       L knee PROM  ED ED ED ED HB ED      L knee patella mob  Gr 3-4 ED ED, gr 3-4 ED, gr 3-4 ED, gr 3-4 HB   gr 3-4 ED gr 3-4                                Neuro Re-Ed             SLS nv 30\"x3 30\"x3 ea 30\"x3 ea 30\"x3 ea 30\"x2ea     30\"x1 ea on foam  Foam 30\"x2 ea      Rockerboard balance AP/ML nv 30\"x3 ea 30\"x3 ea 30\"x3 ea 30\"x3 ea 30\"x3 ea 30\"x3 ea      sidestepping    nv 20' x2 ea 20' x2 20' x3 ea                                                          Ther Ex             SLR 10x 20x 20x 20x 3x10 ea 3x10 3x10      bridge 10x 20x 20x 30x 30x 30x 30x      Standing HS curl 10x 20x  30x " "30x 2# 20x 2# 20x 3# 30x      Mini squat 10x 20x 30x 30x 30x 30x 30x      bike nv 5 min L1 5 min L1 5 min L1 5 min L1 5min L1 5 min L2      Step ups fwd/lat nv 1R 15x ea 1R 20x ea 1R 20x ea 2R 20x ea 2 R 20x ea 2R 30x ea      Step downs nv 1R 15x 1R 20x 1R 30x 1R 30x 1R 30x  1R 30x      Multihip flex/abd/ext nv L 40# 15x ea L 40# 20x ea L 40# 20x ea L 55# 20x ea L 55# 20x ea L 55# 30x ea      LAQ  3# 3\"x20 3# 3\"x30 3# 3\"x30 3# 3\"x30 3# 3\"x30 4# 3\"x30      L abduction SLR  20x 20x 20x  3x10 3x10 3x10      clamshells  20x 20x 30x 30x 30x 30x      Reverse clams  20x 20x 30x 30x 30x  30x      Leg press seat 5   75# 20x 75# 20x 85# 20x 85# 20x 95# 30x      Ther Activity                                       Gait Training                                       Modalities                                            "

## 2024-09-20 ENCOUNTER — OFFICE VISIT (OUTPATIENT)
Age: 54
End: 2024-09-20
Payer: COMMERCIAL

## 2024-09-20 DIAGNOSIS — M25.562 LEFT KNEE PAIN, UNSPECIFIED CHRONICITY: Primary | ICD-10-CM

## 2024-09-20 PROCEDURE — 97110 THERAPEUTIC EXERCISES: CPT | Performed by: PHYSICAL THERAPIST

## 2024-09-20 PROCEDURE — 97112 NEUROMUSCULAR REEDUCATION: CPT | Performed by: PHYSICAL THERAPIST

## 2024-09-20 PROCEDURE — 97140 MANUAL THERAPY 1/> REGIONS: CPT | Performed by: PHYSICAL THERAPIST

## 2024-09-20 NOTE — PROGRESS NOTES
"Daily Note     Today's date: 2024  Patient name: Katarina Copeland  : 1970  MRN: [id number]  Referring provider: Shruthi Son PA-C  Dx:   Encounter Diagnosis     ICD-10-CM    1. Left knee pain, unspecified chronicity  M25.562                        Subjective: Katarina reports that she was kicking around a soccer ball and no longer had a congested feeling in her L knee. She has been doing pretty well since LV. Stairs are going ok at school. She is able to ascend reciprocally but descends non-reciprocally. She had an instance that she was able to go without urination for about 6 hours. She typically wakes 0-2x/night to void.      Objective: See treatment diary below      Assessment: Tolerated treatment well. Patient demonstrated fatigue post treatment, exhibited good technique with therapeutic exercises, and would benefit from continued PT to address her deficits. Katarina was challenged with progression of balance exercises but was able to perform all without increase in pain. She continues to have greatest difficulty with descending stairs with L foot stationary but had improved tolerance on 6 inch step. She has some pain at the end range of passive motion but is able to achieve full range.      Plan: Continue per plan of care.  Progress treatment as tolerated.       Precautions:   Patient Active Problem List   Diagnosis    Dense breast tissue    Increased risk of breast cancer    Family history of malignant neoplasm of breast    BRCA negative    Screening mammogram for high-risk patient           Manuals      L knee PROM  ED ED ED ED HB ED ED     L knee patella mob  Gr 3-4 ED ED, gr 3-4 ED, gr 3-4 ED, gr 3-4 HB   gr 3-4 ED gr 3-4 ED gr 3-4                               Neuro Re-Ed             SLS nv 30\"x3 30\"x3 ea 30\"x3 ea 30\"x3 ea 30\"x2ea     30\"x1 ea on foam  Foam 30\"x2 ea Blk foam 30\"x3 ea     Rockerboard balance AP/ML nv 30\"x3 ea 30\"x3 ea 30\"x3 ea 30\"x3 ea 30\"x3 ea " "30\"x3 ea 30\"x3 ea     sidestepping    nv 20' x2 ea 20' x2 20' x3 ea 20' x3 ea                                                         Ther Ex             SLR 10x 20x 20x 20x 3x10 ea 3x10 3x10 30x ea     bridge 10x 20x 20x 30x 30x 30x 30x 30x     Standing HS curl 10x 20x  30x 30x 2# 20x 2# 20x 3# 30x nv     Mini squat 10x 20x 30x 30x 30x 30x 30x nv     bike nv 5 min L1 5 min L1 5 min L1 5 min L1 5min L1 5 min L2 Nustep 5'     Step ups fwd/lat nv 1R 15x ea 1R 20x ea 1R 20x ea 2R 20x ea 2 R 20x ea 2R 30x ea 6\" 30x     Step downs nv 1R 15x 1R 20x 1R 30x 1R 30x 1R 30x  1R 30x 6\" 20x     Multihip flex/abd/ext nv L 40# 15x ea L 40# 20x ea L 40# 20x ea L 55# 20x ea L 55# 20x ea L 55# 30x ea np     LAQ  3# 3\"x20 3# 3\"x30 3# 3\"x30 3# 3\"x30 3# 3\"x30 4# 3\"x30 4# 3\"x30     L abduction SLR  20x 20x 20x  3x10 3x10 3x10 30x     clamshells  20x 20x 30x 30x 30x 30x 30x     Reverse clams  20x 20x 30x 30x 30x  30x 30x     Leg press seat 5   75# 20x 75# 20x 85# 20x 85# 20x 95# 30x 95# 30x     Ther Activity                                       Gait Training                                       Modalities                                            "

## 2024-10-03 ENCOUNTER — OFFICE VISIT (OUTPATIENT)
Age: 54
End: 2024-10-03
Payer: COMMERCIAL

## 2024-10-03 DIAGNOSIS — M25.562 LEFT KNEE PAIN, UNSPECIFIED CHRONICITY: Primary | ICD-10-CM

## 2024-10-03 PROCEDURE — 97112 NEUROMUSCULAR REEDUCATION: CPT | Performed by: PHYSICAL THERAPIST

## 2024-10-03 PROCEDURE — 97110 THERAPEUTIC EXERCISES: CPT | Performed by: PHYSICAL THERAPIST

## 2024-10-03 NOTE — PROGRESS NOTES
"Daily Note     Today's date: 10/3/2024  Patient name: Katarina Copeland  : 1970  MRN: [id number]  Referring provider: Shruthi Son PA-C  Dx:   Encounter Diagnosis     ICD-10-CM    1. Left knee pain, unspecified chronicity  M25.562                        Subjective: Katarina has been trying to descend stairs reciprocally, had some pain after climbing 4 flights of stairs for her doctor's appointment the other day. She is frustrated that she just got her period for the first time in 5 years. She is unsure if this is post-menopausal bleeding or related to PCOS or an actual period. Advised pt to contact gynecologist as post-menopausal bleeding requires additional assessment.       Objective: See treatment diary below      Assessment: Tolerated treatment well. Patient demonstrated fatigue post treatment, exhibited good technique with therapeutic exercises, and would benefit from continued PT to address her deficits. Katarina was challenged with resisted exercises and step downs but was able to perform both without increase in pain. She did not have pain at end range of PROM today and was able to achieve full range.      Plan: Continue per plan of care.  Progress treatment as tolerated.       Precautions:   Patient Active Problem List   Diagnosis    Dense breast tissue    Increased risk of breast cancer    Family history of malignant neoplasm of breast    BRCA negative    Screening mammogram for high-risk patient           Manuals 8/1 8/5 8/8 8/19 8/23 8/29 9/6 9/20 10/3    L knee PROM  ED ED ED ED HB ED ED ED    L knee patella mob  Gr 3-4 ED ED, gr 3-4 ED, gr 3-4 ED, gr 3-4 HB   gr 3-4 ED gr 3-4 ED gr 3-4 ED gr 3-4                              Neuro Re-Ed             SLS nv 30\"x3 30\"x3 ea 30\"x3 ea 30\"x3 ea 30\"x2ea     30\"x1 ea on foam  Foam 30\"x2 ea Blk foam 30\"x3 ea Blk foam 30\"x3 ea    Rockerboard balance AP/ML nv 30\"x3 ea 30\"x3 ea 30\"x3 ea 30\"x3 ea 30\"x3 ea 30\"x3 ea 30\"x3 ea 30\"x3 ea    sidestepping    nv 20' x2 " "ea 20' x2 20' x3 ea 20' x3 ea 30' x3 ea                                                        Ther Ex             SLR 10x 20x 20x 20x 3x10 ea 3x10 3x10 30x ea 30x    bridge 10x 20x 20x 30x 30x 30x 30x 30x 30x    Standing HS curl 10x 20x  30x 30x 2# 20x 2# 20x 3# 30x nv 4# 30x    Mini squat 10x 20x 30x 30x 30x 30x 30x nv 30x    bike nv 5 min L1 5 min L1 5 min L1 5 min L1 5min L1 5 min L2 Nustep 5' 5 min    Step ups fwd/lat nv 1R 15x ea 1R 20x ea 1R 20x ea 2R 20x ea 2 R 20x ea 2R 30x ea 6\" 30x 6\" 30x ea    Step downs nv 1R 15x 1R 20x 1R 30x 1R 30x 1R 30x  1R 30x 6\" 20x 6\" 30x    Multihip flex/abd/ext nv L 40# 15x ea L 40# 20x ea L 40# 20x ea L 55# 20x ea L 55# 20x ea L 55# 30x ea np np    LAQ  3# 3\"x20 3# 3\"x30 3# 3\"x30 3# 3\"x30 3# 3\"x30 4# 3\"x30 4# 3\"x30 4# 3\"x30    L abduction SLR  20x 20x 20x  3x10 3x10 3x10 30x 30x    clamshells  20x 20x 30x 30x 30x 30x 30x 30x    Reverse clams  20x 20x 30x 30x 30x  30x 30x 30x    Leg press seat 5   75# 20x 75# 20x 85# 20x 85# 20x 95# 30x 95# 30x 105# 30x    Ther Activity                                       Gait Training                                       Modalities                                            "

## 2024-10-16 ENCOUNTER — OFFICE VISIT (OUTPATIENT)
Age: 54
End: 2024-10-16
Payer: COMMERCIAL

## 2024-10-16 DIAGNOSIS — M25.562 LEFT KNEE PAIN, UNSPECIFIED CHRONICITY: Primary | ICD-10-CM

## 2024-10-16 PROCEDURE — 97112 NEUROMUSCULAR REEDUCATION: CPT | Performed by: PHYSICAL THERAPIST

## 2024-10-16 PROCEDURE — 97110 THERAPEUTIC EXERCISES: CPT | Performed by: PHYSICAL THERAPIST

## 2024-10-16 NOTE — PROGRESS NOTES
"Daily Note     Today's date: 10/16/2024  Patient name: Katarina Copeland  : 1970  MRN: [id number]  Referring provider: Shruthi Son PA-C  Dx:   Encounter Diagnosis     ICD-10-CM    1. Left knee pain, unspecified chronicity  M25.562                        Subjective: Katarina states that the knee is about the same. She continues to have some difficulty with descending stairs. She was able to go for a long walk without any increase in pain. She notes that she has not been icing her knee regularly lately.       Objective: See treatment diary below      Assessment: Tolerated treatment well. Patient demonstrated fatigue post treatment and exhibited good technique with therapeutic exercises. Katarina had good tolerance to all exercises and was able to maintain good form throughout without increase in pain. At this time, she feels that she can manage remaining symptoms independently. She is discharged to [facility] and will contact office with any additional concerns.      Plan:  Discharge to [facility].     Precautions:   Patient Active Problem List   Diagnosis    Dense breast tissue    Increased risk of breast cancer    Family history of malignant neoplasm of breast    BRCA negative    Screening mammogram for high-risk patient           Manuals 8/1 8/5 8/8 8/19 8/23 8/29 9/6 9/20 10/3 10/16   L knee PROM  ED ED ED ED HB ED ED ED ED   L knee patella mob  Gr 3-4 ED ED, gr 3-4 ED, gr 3-4 ED, gr 3-4 HB   gr 3-4 ED gr 3-4 ED gr 3-4 ED gr 3-4 ED gr 3-4                             Neuro Re-Ed             SLS nv 30\"x3 30\"x3 ea 30\"x3 ea 30\"x3 ea 30\"x2ea     30\"x1 ea on foam  Foam 30\"x2 ea Blk foam 30\"x3 ea Blk foam 30\"x3 ea Blk foam 30\"x3 ea   Rockerboard balance AP/ML nv 30\"x3 ea 30\"x3 ea 30\"x3 ea 30\"x3 ea 30\"x3 ea 30\"x3 ea 30\"x3 ea 30\"x3 ea 30\"x3 ea   sidestepping    nv 20' x2 ea 20' x2 20' x3 ea 20' x3 ea 30' x3 ea 30'x4 ea                                                       Ther Ex             SLR 10x 20x 20x 20x 3x10 " "ea 3x10 3x10 30x ea 30x 30x   bridge 10x 20x 20x 30x 30x 30x 30x 30x 30x 30x   Standing HS curl 10x 20x  30x 30x 2# 20x 2# 20x 3# 30x nv 4# 30x 4# 30x   Mini squat 10x 20x 30x 30x 30x 30x 30x nv 30x 30x   bike nv 5 min L1 5 min L1 5 min L1 5 min L1 5min L1 5 min L2 Nustep 5' 5 min 5 min   Step ups fwd/lat nv 1R 15x ea 1R 20x ea 1R 20x ea 2R 20x ea 2 R 20x ea 2R 30x ea 6\" 30x 6\" 30x ea 6\" 30x ea   Step downs nv 1R 15x 1R 20x 1R 30x 1R 30x 1R 30x  1R 30x 6\" 20x 6\" 30x 6\" 30x   Multihip flex/abd/ext nv L 40# 15x ea L 40# 20x ea L 40# 20x ea L 55# 20x ea L 55# 20x ea L 55# 30x ea np np np   LAQ  3# 3\"x20 3# 3\"x30 3# 3\"x30 3# 3\"x30 3# 3\"x30 4# 3\"x30 4# 3\"x30 4# 3\"x30 4# 3\"x30   L abduction SLR  20x 20x 20x  3x10 3x10 3x10 30x 30x 30x   clamshells  20x 20x 30x 30x 30x 30x 30x 30x 30x   Reverse clams  20x 20x 30x 30x 30x  30x 30x 30x 30x   Leg press seat 5   75# 20x 75# 20x 85# 20x 85# 20x 95# 30x 95# 30x 105# 30x 105# 30x   Ther Activity                                       Gait Training                                       Modalities                                            "